# Patient Record
Sex: FEMALE | Race: WHITE | NOT HISPANIC OR LATINO | Employment: OTHER | URBAN - NONMETROPOLITAN AREA
[De-identification: names, ages, dates, MRNs, and addresses within clinical notes are randomized per-mention and may not be internally consistent; named-entity substitution may affect disease eponyms.]

---

## 2017-01-01 ENCOUNTER — TRANSCRIBE ORDERS (OUTPATIENT)
Dept: LAB | Facility: HOSPITAL | Age: 62
End: 2017-01-01

## 2017-01-01 ENCOUNTER — OFFICE VISIT (OUTPATIENT)
Dept: FAMILY MEDICINE CLINIC | Facility: CLINIC | Age: 62
End: 2017-01-01

## 2017-01-01 ENCOUNTER — APPOINTMENT (OUTPATIENT)
Dept: LAB | Facility: HOSPITAL | Age: 62
End: 2017-01-01

## 2017-01-01 ENCOUNTER — TELEPHONE (OUTPATIENT)
Dept: FAMILY MEDICINE CLINIC | Facility: CLINIC | Age: 62
End: 2017-01-01

## 2017-01-01 VITALS
WEIGHT: 154 LBS | SYSTOLIC BLOOD PRESSURE: 154 MMHG | TEMPERATURE: 98.8 F | HEIGHT: 62 IN | HEART RATE: 114 BPM | DIASTOLIC BLOOD PRESSURE: 98 MMHG | OXYGEN SATURATION: 90 % | BODY MASS INDEX: 28.34 KG/M2

## 2017-01-01 VITALS
SYSTOLIC BLOOD PRESSURE: 158 MMHG | BODY MASS INDEX: 28.34 KG/M2 | OXYGEN SATURATION: 85 % | TEMPERATURE: 98.1 F | HEIGHT: 62 IN | HEART RATE: 103 BPM | DIASTOLIC BLOOD PRESSURE: 94 MMHG | WEIGHT: 154 LBS

## 2017-01-01 VITALS
SYSTOLIC BLOOD PRESSURE: 154 MMHG | OXYGEN SATURATION: 90 % | DIASTOLIC BLOOD PRESSURE: 82 MMHG | HEART RATE: 93 BPM | TEMPERATURE: 99.3 F | HEIGHT: 62 IN

## 2017-01-01 DIAGNOSIS — G60.9 IDIOPATHIC PERIPHERAL NEUROPATHY: ICD-10-CM

## 2017-01-01 DIAGNOSIS — Z51.81 ENCOUNTER FOR THERAPEUTIC DRUG MONITORING: ICD-10-CM

## 2017-01-01 DIAGNOSIS — R60.9 PERIPHERAL EDEMA: ICD-10-CM

## 2017-01-01 DIAGNOSIS — I50.9 CHRONIC CONGESTIVE HEART FAILURE, UNSPECIFIED CONGESTIVE HEART FAILURE TYPE: Primary | ICD-10-CM

## 2017-01-01 DIAGNOSIS — R09.02 COPD WITH HYPOXIA (HCC): ICD-10-CM

## 2017-01-01 DIAGNOSIS — I49.9 IRREGULAR HEART RHYTHM: ICD-10-CM

## 2017-01-01 DIAGNOSIS — I50.9 CHRONIC CONGESTIVE HEART FAILURE, UNSPECIFIED CONGESTIVE HEART FAILURE TYPE: ICD-10-CM

## 2017-01-01 DIAGNOSIS — F41.9 ANXIETY: ICD-10-CM

## 2017-01-01 DIAGNOSIS — I10 ESSENTIAL HYPERTENSION: ICD-10-CM

## 2017-01-01 DIAGNOSIS — J43.9 PULMONARY EMPHYSEMA, UNSPECIFIED EMPHYSEMA TYPE (HCC): ICD-10-CM

## 2017-01-01 DIAGNOSIS — R56.9 UNSPECIFIED CONVULSIONS (HCC): Primary | ICD-10-CM

## 2017-01-01 DIAGNOSIS — R09.02 COPD WITH HYPOXIA (HCC): Primary | ICD-10-CM

## 2017-01-01 DIAGNOSIS — I10 ESSENTIAL HYPERTENSION: Primary | ICD-10-CM

## 2017-01-01 DIAGNOSIS — J44.9 COPD WITH HYPOXIA (HCC): Primary | ICD-10-CM

## 2017-01-01 DIAGNOSIS — J44.9 COPD WITH HYPOXIA (HCC): ICD-10-CM

## 2017-01-01 DIAGNOSIS — N18.30 CKD (CHRONIC KIDNEY DISEASE) STAGE 3, GFR 30-59 ML/MIN (HCC): ICD-10-CM

## 2017-01-01 DIAGNOSIS — F32.9 SINGLE CURRENT EPISODE OF MAJOR DEPRESSIVE DISORDER, UNSPECIFIED DEPRESSION EPISODE SEVERITY: ICD-10-CM

## 2017-01-01 LAB
ANION GAP SERPL CALCULATED.3IONS-SCNC: 13 MMOL/L (ref 5–15)
BUN BLD-MCNC: 17 MG/DL (ref 7–21)
BUN/CREAT SERPL: 11.6 (ref 7–25)
CALCIUM SPEC-SCNC: 8.8 MG/DL (ref 8.4–10.2)
CHLORIDE SERPL-SCNC: 97 MMOL/L (ref 95–110)
CO2 SERPL-SCNC: 26 MMOL/L (ref 22–31)
CREAT BLD-MCNC: 1.47 MG/DL (ref 0.5–1)
DEPRECATED RDW RBC AUTO: 40.8 FL (ref 36.4–46.3)
EOSINOPHIL # BLD MANUAL: 0.33 10*3/MM3 (ref 0–0.7)
EOSINOPHIL NFR BLD MANUAL: 9 % (ref 0–7)
ERYTHROCYTE [DISTWIDTH] IN BLOOD BY AUTOMATED COUNT: 13.5 % (ref 11.5–14.5)
GFR SERPL CREATININE-BSD FRML MDRD: 36 ML/MIN/1.73 (ref 60–104)
GLUCOSE BLD-MCNC: 91 MG/DL (ref 60–100)
HCT VFR BLD AUTO: 34.9 % (ref 35–45)
HGB BLD-MCNC: 11.9 G/DL (ref 12–15.5)
LYMPHOCYTES # BLD MANUAL: 1.5 10*3/MM3 (ref 0.6–4.2)
LYMPHOCYTES NFR BLD MANUAL: 12 % (ref 0–12)
LYMPHOCYTES NFR BLD MANUAL: 41 % (ref 10–50)
MCH RBC QN AUTO: 28.2 PG (ref 26.5–34)
MCHC RBC AUTO-ENTMCNC: 34.1 G/DL (ref 31.4–36)
MCV RBC AUTO: 82.7 FL (ref 80–98)
MONOCYTES # BLD AUTO: 0.44 10*3/MM3 (ref 0–0.9)
NEUTROPHILS # BLD AUTO: 1.39 10*3/MM3 (ref 2–8.6)
NEUTROPHILS NFR BLD MANUAL: 38 % (ref 37–80)
OXCARBAZEPINE: NORMAL UG/ML (ref 10–35)
PLAT MORPH BLD: NORMAL
PLATELET # BLD AUTO: 206 10*3/MM3 (ref 150–450)
PMV BLD AUTO: 9.3 FL (ref 8–12)
POTASSIUM BLD-SCNC: 4.8 MMOL/L (ref 3.5–5.1)
RBC # BLD AUTO: 4.22 10*6/MM3 (ref 3.77–5.16)
RBC MORPH BLD: NORMAL
SODIUM BLD-SCNC: 136 MMOL/L (ref 137–145)
WBC MORPH BLD: NORMAL
WBC NRBC COR # BLD: 3.66 10*3/MM3 (ref 3.2–9.8)

## 2017-01-01 PROCEDURE — 99214 OFFICE O/P EST MOD 30 MIN: CPT | Performed by: NURSE PRACTITIONER

## 2017-01-01 PROCEDURE — 80183 DRUG SCRN QUANT OXCARBAZEPIN: CPT | Performed by: PSYCHIATRY & NEUROLOGY

## 2017-01-01 PROCEDURE — 99203 OFFICE O/P NEW LOW 30 MIN: CPT | Performed by: NURSE PRACTITIONER

## 2017-01-01 PROCEDURE — 36415 COLL VENOUS BLD VENIPUNCTURE: CPT | Performed by: PSYCHIATRY & NEUROLOGY

## 2017-01-01 PROCEDURE — 93000 ELECTROCARDIOGRAM COMPLETE: CPT | Performed by: NURSE PRACTITIONER

## 2017-01-01 PROCEDURE — 85027 COMPLETE CBC AUTOMATED: CPT | Performed by: PSYCHIATRY & NEUROLOGY

## 2017-01-01 PROCEDURE — 85007 BL SMEAR W/DIFF WBC COUNT: CPT | Performed by: PSYCHIATRY & NEUROLOGY

## 2017-01-01 PROCEDURE — 80048 BASIC METABOLIC PNL TOTAL CA: CPT | Performed by: PSYCHIATRY & NEUROLOGY

## 2017-01-01 RX ORDER — GABAPENTIN 300 MG/1
3 CAPSULE ORAL DAILY
Refills: 3 | COMMUNITY
Start: 2017-01-01 | End: 2018-01-01 | Stop reason: SDUPTHER

## 2017-01-01 RX ORDER — HYDROXYZINE HYDROCHLORIDE 10 MG/1
3 TABLET, FILM COATED ORAL DAILY
Refills: 4 | COMMUNITY
Start: 2017-01-01

## 2017-01-01 RX ORDER — SPIRONOLACTONE AND HYDROCHLOROTHIAZIDE 25; 25 MG/1; MG/1
1 TABLET ORAL DAILY
Refills: 12 | COMMUNITY
Start: 2017-01-01

## 2017-01-01 RX ORDER — ALBUTEROL SULFATE 90 UG/1
1 AEROSOL, METERED RESPIRATORY (INHALATION) AS NEEDED
Refills: 12 | COMMUNITY
Start: 2017-01-01

## 2017-01-01 RX ORDER — POTASSIUM CHLORIDE 750 MG/1
1 TABLET, EXTENDED RELEASE ORAL DAILY
COMMUNITY
Start: 2017-01-01

## 2017-01-01 RX ORDER — RANITIDINE 150 MG/1
1 TABLET ORAL DAILY
COMMUNITY
Start: 2017-01-01 | End: 2018-01-01

## 2017-01-01 RX ORDER — FEXOFENADINE HCL 180 MG/1
1 TABLET, FILM COATED ORAL DAILY
Refills: 12 | COMMUNITY
Start: 2017-01-01

## 2017-01-01 RX ORDER — CARVEDILOL 12.5 MG/1
12.5 TABLET ORAL 2 TIMES DAILY
Qty: 60 TABLET | Refills: 5 | Status: SHIPPED | OUTPATIENT
Start: 2017-01-01

## 2017-01-01 RX ORDER — CETIRIZINE HYDROCHLORIDE 10 MG/1
1 TABLET, FILM COATED ORAL DAILY
Refills: 12 | COMMUNITY
Start: 2017-01-01

## 2017-01-01 RX ORDER — NITROGLYCERIN 120 MG/1
1 PATCH TRANSDERMAL AS NEEDED
COMMUNITY
Start: 2017-01-01

## 2017-01-01 RX ORDER — CARVEDILOL 3.12 MG/1
2 TABLET ORAL DAILY
Refills: 6 | COMMUNITY
Start: 2017-01-01 | End: 2017-01-01 | Stop reason: SDUPTHER

## 2017-01-01 RX ORDER — DIGOXIN 125 MCG
1 TABLET ORAL DAILY
Refills: 6 | COMMUNITY
Start: 2017-01-01

## 2017-01-01 RX ORDER — PROMETHAZINE HYDROCHLORIDE 25 MG/1
2 TABLET ORAL DAILY
COMMUNITY
Start: 2017-01-01 | End: 2018-01-01 | Stop reason: SDUPTHER

## 2017-01-01 RX ORDER — FUROSEMIDE 40 MG/1
1 TABLET ORAL DAILY
Refills: 5 | COMMUNITY
Start: 2017-01-01

## 2017-01-01 RX ORDER — FEXOFENADINE HYDROCHLORIDE AND PSEUDOEPHEDRINE HYDROCHLORIDE 60; 120 MG/1; MG/1
2 TABLET, FILM COATED, EXTENDED RELEASE ORAL DAILY
COMMUNITY
Start: 2017-01-01

## 2017-11-27 PROBLEM — I50.9 CHRONIC CONGESTIVE HEART FAILURE (HCC): Status: ACTIVE | Noted: 2017-01-01

## 2017-11-27 PROBLEM — R60.9 PERIPHERAL EDEMA: Status: ACTIVE | Noted: 2017-01-01

## 2017-11-27 PROBLEM — I10 ESSENTIAL HYPERTENSION: Status: ACTIVE | Noted: 2017-01-01

## 2017-11-27 PROBLEM — F41.9 ANXIETY: Status: ACTIVE | Noted: 2017-01-01

## 2017-11-27 PROBLEM — J43.9 PULMONARY EMPHYSEMA (HCC): Status: ACTIVE | Noted: 2017-01-01

## 2017-11-27 PROBLEM — G60.9 IDIOPATHIC PERIPHERAL NEUROPATHY: Status: ACTIVE | Noted: 2017-01-01

## 2017-11-27 NOTE — PROGRESS NOTES
"Chief Complaint   Patient presents with   • Foot Swelling     ongoing history    • Shortness of Breath     at night and when walking        Subjective   HPI   Mariela Torres is a 62 y.o. female presents to the office to establish care.   Prior PCP Dr. Christopher    PMH:   HTN- managed with carvedilol    CHF- managed with lasix, aldactone    COPD- managed with albuterol, incruse    Peripheral neuropathy- managed with gabapentin    Hypokalemia- managed with Kclor    GERD- daily zantac    HPI  Patient presents to establish care. Acute on chronic complaints include difficulty breathing and BLE pain and edema. She states she is establishing care only because her children fear her current PCP is \"going to die and I wont have anyone to take over my care\".     She is unhappy about being here as she feels forced to be here by her children. She states \"all I want to do is be left alone\". She states several times that her son is \"driving me crazy\".     She states she is happy with her current PCP and does not care to change at this time.     Medical history is reviewed with patient and is complex.   When asked what she would like to complete today, she replies, \"I would like to breath better and for my legs to not hurt as much. I think I would go out more, maybe go dancing if my legs felt better. But I cant hardly get too far without having trouble breathing\".     She denies suicidal thoughts and ideation, but does admit if she \"were to die, it wouldn't be a bad thing. May be a blessing in disguise\".     Medical records requested from Dr. Christopher     Family History   Problem Relation Age of Onset   • Diabetes Mother      Social History     Social History   • Marital status:      Spouse name: N/A   • Number of children: N/A   • Years of education: N/A     Occupational History   • Not on file.     Social History Main Topics   • Smoking status: Current Every Day Smoker     Packs/day: 0.50     Years: 45.00     Types: " Cigarettes   • Smokeless tobacco: Current User     Types: Snuff   • Alcohol use No   • Drug use: No   • Sexual activity: No     Other Topics Concern   • Not on file     Social History Narrative   • No narrative on file       Review of Systems   Constitutional: Positive for activity change and appetite change. Negative for chills, fatigue, fever and unexpected weight change.   HENT: Negative.  Negative for congestion, ear pain, postnasal drip, rhinorrhea, sinus pressure and sore throat.    Eyes: Negative.  Negative for pain and redness.   Respiratory: Positive for shortness of breath and wheezing. Negative for cough, choking and chest tightness.    Cardiovascular: Negative.  Negative for chest pain, palpitations and leg swelling.   Gastrointestinal: Negative.  Negative for abdominal distention, abdominal pain, constipation, diarrhea, nausea and rectal pain.   Endocrine: Negative.    Genitourinary: Negative.  Negative for decreased urine volume, difficulty urinating, dysuria, flank pain, hematuria and urgency.   Musculoskeletal: Positive for arthralgias, back pain and myalgias. Negative for gait problem and neck pain.   Skin: Negative.  Negative for rash and wound.   Allergic/Immunologic: Negative.    Neurological: Positive for weakness and numbness. Negative for dizziness, syncope, light-headedness and headaches.   Hematological: Negative.    Psychiatric/Behavioral: Positive for dysphoric mood. Negative for agitation, behavioral problems, confusion, self-injury, sleep disturbance and suicidal ideas. The patient is not nervous/anxious.      14 Point ROS completed with pertinent positives discussed. All other systems reviewed and are negative.     The following portions of the patient's history were reviewed and updated as appropriate: allergies, current medications, past family history, past medical history, past social history, past surgical history and problem list.    Encounter Vitals  Vitals:    11/27/17 0838  "  BP: 158/94   Pulse: 103   Temp: 98.1 °F (36.7 °C)   TempSrc: Tympanic   SpO2: (!) 85%   Weight: 154 lb (69.9 kg)   Height: 62\" (157.5 cm)   PainSc: 0-No pain       Objective:  Physical Exam   Constitutional: She is oriented to person, place, and time. She appears well-developed and well-nourished.  Non-toxic appearance.   HENT:   Head: Normocephalic and atraumatic.   Right Ear: Tympanic membrane, external ear and ear canal normal.   Left Ear: Tympanic membrane, external ear and ear canal normal.   Nose: Nose normal.   Mouth/Throat: Uvula is midline, oropharynx is clear and moist and mucous membranes are normal. No posterior oropharyngeal edema or posterior oropharyngeal erythema.   Eyes: Lids are normal. Pupils are equal, round, and reactive to light.   Neck: Trachea normal and normal range of motion. Neck supple. No thyroid mass present.   Cardiovascular: Normal rate, S1 normal, S2 normal, normal heart sounds and intact distal pulses.  An irregularly irregular rhythm present. Frequent extrasystoles are present. Exam reveals no gallop and no friction rub.    No murmur heard.  HTN  Heart rhythm sounds and feels like A-fib  EKG completed   Pulmonary/Chest: Effort normal. No respiratory distress. She has wheezes in the right upper field, the right lower field, the left upper field and the left lower field. She has rhonchi in the right lower field and the left lower field. She has no rales.   Abdominal: Soft. Normal appearance and bowel sounds are normal. She exhibits no mass. There is no rebound and no guarding.   Musculoskeletal: Normal range of motion.   Lymphadenopathy:     She has no cervical adenopathy.   Neurological: She is alert and oriented to person, place, and time. She has normal strength. No cranial nerve deficit or sensory deficit. Gait normal.   Skin: Skin is warm and dry. No rash noted.   Psychiatric: She has a normal mood and affect. Her speech is normal and behavior is normal. Judgment and thought " content normal. Cognition and memory are normal.   Nursing note and vitals reviewed.      Pertinent Labs  No visits with results within 3 Month(s) from this visit.  Latest known visit with results is:    Transcribe Orders on 03/30/2017   Component Date Value Ref Range Status   • Glucose 03/30/2017 91  60 - 100 mg/dL Final   • BUN 03/30/2017 17  7 - 21 mg/dL Final   • Creatinine 03/30/2017 1.47* 0.50 - 1.00 mg/dL Final   • Sodium 03/30/2017 136* 137 - 145 mmol/L Final   • Potassium 03/30/2017 4.8  3.5 - 5.1 mmol/L Final   • Chloride 03/30/2017 97  95 - 110 mmol/L Final   • CO2 03/30/2017 26.0  22.0 - 31.0 mmol/L Final   • Calcium 03/30/2017 8.8  8.4 - 10.2 mg/dL Final   • eGFR Non African Amer 03/30/2017 36* >60 mL/min/1.73 Final   • BUN/Creatinine Ratio 03/30/2017 11.6  7.0 - 25.0 Final   • Anion Gap 03/30/2017 13.0  5.0 - 15.0 mmol/L Final   • Oxcarbazepine 03/30/2017 None Detected  10 - 35 ug/mL Final                                    Detection Limit = 1   • WBC 03/30/2017 3.66  3.20 - 9.80 10*3/mm3 Final   • RBC 03/30/2017 4.22  3.77 - 5.16 10*6/mm3 Final   • Hemoglobin 03/30/2017 11.9* 12.0 - 15.5 g/dL Final   • Hematocrit 03/30/2017 34.9* 35.0 - 45.0 % Final   • MCV 03/30/2017 82.7  80.0 - 98.0 fL Final   • MCH 03/30/2017 28.2  26.5 - 34.0 pg Final   • MCHC 03/30/2017 34.1  31.4 - 36.0 g/dL Final   • RDW 03/30/2017 13.5  11.5 - 14.5 % Final   • RDW-SD 03/30/2017 40.8  36.4 - 46.3 fl Final   • MPV 03/30/2017 9.3  8.0 - 12.0 fL Final   • Platelets 03/30/2017 206  150 - 450 10*3/mm3 Final   • Neutrophil % 03/30/2017 38.0  37.0 - 80.0 % Final   • Lymphocyte % 03/30/2017 41.0  10.0 - 50.0 % Final   • Monocyte % 03/30/2017 12.0  0.0 - 12.0 % Final   • Eosinophil % 03/30/2017 9.0* 0.0 - 7.0 % Final   • Neutrophils Absolute 03/30/2017 1.39* 2.00 - 8.60 10*3/mm3 Final   • Lymphocytes Absolute 03/30/2017 1.50  0.60 - 4.20 10*3/mm3 Final   • Monocytes Absolute 03/30/2017 0.44  0.00 - 0.90 10*3/mm3 Final   • Eosinophils  "Absolute 03/30/2017 0.33  0.00 - 0.70 10*3/mm3 Final   • RBC Morphology 03/30/2017 Normal  Normal Final   • WBC Morphology 03/30/2017 Normal  Normal Final   • Platelet Morphology 03/30/2017 Normal  Normal Final     Labs have been independently reviewed    Key Imaging/Tracings/POC Testing  EKG:  Indication- abnormal rhythm palpated and auscultated  No comparison on file    Vent rate: 104 bpm  Mirta: 140 ms  QRS: 136 ms  QT/QTc: 386/500 ms    Sinus tach with PAC versus A-fib    Assessment and Medications  Mariela was seen today for foot swelling and shortness of breath.    Diagnoses and all orders for this visit:    Essential hypertension    Irregular heart rhythm  -     ECG 12 Lead    Peripheral edema    Chronic congestive heart failure, unspecified congestive heart failure type    COPD with hypoxia    Single current episode of major depressive disorder, unspecified depression episode severity           Plan/Additional Notes/Instructions  Plan   1. Discussed in length the complex nature of her cardiovascular and respiratory disorders. Discussed concern for possible A-fib. Discussed concern for worsening CHF and COPD- with possibility of nearing end of life disease progression without medical intervention.   2. Strongly recommended patient to be transported to hospital of choice given her complex medical problems: irregular heart rhythm, worsening COPD, CHF, and BLE edema and pain  3. Offered to refer patient to pulmonary and cardiology if she would not consider inpatient care  4. Patient refuses all treatment and referrals today  5. Patient reiterates she is happy with her current PCP and will continue to see him until he no longer practices.   6. She states, \"my check is coming Friday; I dont want my son to get it. If Im still here next week, Ill think about coming back to see you and going in the hospital for a few days\".   7. Will still review medical record independently once obtained  8. Patient is alert, oriented, " and capable of making her own medical decisions. No confusion or disorientation.  9. Patient denies thoughts and/or intentions on harming herself. She denies suicidal thoughts and ideation. She verbalizes she understands her complex medical conditions and also states she knows her health is deteriorating rapidly. She again states she does not wish to medically intervene at this time.     10. Patient has chosen to leave AMA with full understanding of possible progression of each of her complex medical problems, also to include death.  11. I have reviewed the above notes with patient, and she has been made aware the above notes would be a part of her medical record.     Follow-Up  Return if symptoms worsen or fail to improve.    Patient/caregiver verbalizes understanding of all orders and instructions in this plan of care.       PHQ-2/PHQ-9 Depression Screening 11/27/2017   Little interest or pleasure in doing things 2   Feeling down, depressed, or hopeless 2   Trouble falling or staying asleep, or sleeping too much 3   Feeling tired or having little energy 3   Poor appetite or overeating 3   Feeling bad about yourself - or that you are a failure or have let yourself or your family down 3   Trouble concentrating on things, such as reading the newspaper or watching television 0   Moving or speaking so slowly that other people could have noticed. Or the opposite - being so fidgety or restless that you have been moving around a lot more than usual 3   Thoughts that you would be better off dead, or of hurting yourself in some way 2   Total Score 21   If you checked off any problems, how difficult have these problems made it for you to do your work, take care of things at home, or get along with other people? Somewhat difficult     Total Score: 21           This document has been electronically signed by DANIELE Carpenter on November 29, 2017 9:53 AM

## 2017-11-28 PROBLEM — I49.9 IRREGULAR HEART RHYTHM: Status: ACTIVE | Noted: 2017-01-01

## 2017-11-28 PROBLEM — R09.02 COPD WITH HYPOXIA (HCC): Status: ACTIVE | Noted: 2017-01-01

## 2017-11-28 PROBLEM — J44.9 COPD WITH HYPOXIA (HCC): Status: ACTIVE | Noted: 2017-01-01

## 2017-11-28 NOTE — PROGRESS NOTES
If patient returns this week, I would like to see her admitted for inpatient evaluation.   If she is not agreeable to inpatient rehab- will likely order:    Orders:  full labs to include BNP.     CXR, PFT, echo.    Meds:   Will start ACE-I and possibly increase lasix    Referrals:  pulm  cardiology

## 2017-12-05 NOTE — PROGRESS NOTES
"Chief Complaint   Patient presents with   • Hypertension   • Foot Swelling     fluid on feet and legs    • Breathing Problem   • Fall     fainting and falling - 5 times since 11/27/17   • Skin Lesion     on back - itch   • Memory Loss       Subjective     Mariela Torres is a 62 y.o. female presents to the office requesting to proceed with medical work up that was suggested at her last office visit.  Patient is accompanied by her sister.     Prior PCP Dr. Christopher    [facility]:   HTN- managed with carvedilol- poorly controlled    CHF- managed with lasix, aldactone- poorly controlled    COPD- managed with albuterol, incruse-poorly controlled    Peripheral neuropathy- managed with gabapentin    Hypokalemia- managed with Kclor    GERD- daily zantac    HPI   Patient presents with c/o difficulty breathing, ble edema, and falling 5 times since our last office visit on 11/27/17. She states she is \"ready to be taken to the hospital to have a work up to see what all is going on\". She expresses interest in seeing someone about her \"breathing\" and about \"my heart\".   Breathing is better today than at our last appointment. She continue to c/o  Of KOLB, orthopnea and PND, but isn't as winded with activity today.     BLE edema is the same- no change. Associated symptoms include numbness to bilateral feet.     She denies fever, chest pain, diaphoresis, pink frothy sputum, and feelings of impending doom.     She continues to voice depression, but does admit she feels a little better today.       Hypertension   This is a chronic problem. The current episode started more than 1 year ago. The problem is unchanged. The problem is uncontrolled. Associated symptoms include orthopnea, palpitations, peripheral edema, PND and shortness of breath. There are no associated agents to hypertension. Risk factors for coronary artery disease include family history, dyslipidemia, sedentary lifestyle and smoking/tobacco exposure. Past treatments include " beta blockers. The current treatment provides mild improvement. Compliance problems include diet and exercise.  Hypertensive end-organ damage includes heart failure.   Breathing Problem   She complains of difficulty breathing, shortness of breath and wheezing. This is a chronic problem. The problem occurs daily. The cough is productive of sputum. Associated symptoms include appetite change, orthopnea and PND. Pertinent negatives include no ear pain, postnasal drip or rhinorrhea. Her symptoms are aggravated by lying down, strenuous activity and URI. Her symptoms are alleviated by change in position. She reports no improvement on treatment.   Fall   The accident occurred 5 to 7 days ago. The fall occurred while standing and while walking. Distance fallen: from standing. She landed on hard floor. The volume of blood lost was minimal. The point of impact was the head (BUE). Pertinent negatives include no hematuria.     Medical records requested from Dr. Christopher     Family History   Problem Relation Age of Onset   • Diabetes Mother      Social History     Social History   • Marital status:      Spouse name: N/A   • Number of children: N/A   • Years of education: N/A     Occupational History   • Not on file.     Social History Main Topics   • Smoking status: Current Every Day Smoker     Packs/day: 0.50     Years: 45.00     Types: Cigarettes   • Smokeless tobacco: Current User     Types: Snuff   • Alcohol use No   • Drug use: No   • Sexual activity: No     Other Topics Concern   • Not on file     Social History Narrative       Review of Systems   Constitutional: Positive for activity change and appetite change. Negative for unexpected weight change.   HENT: Negative.  Negative for ear pain, postnasal drip, rhinorrhea and sinus pressure.    Eyes: Negative.  Negative for pain and redness.   Respiratory: Positive for shortness of breath and wheezing. Negative for choking and chest tightness.    Cardiovascular:  "Positive for palpitations, orthopnea, leg swelling and PND.   Gastrointestinal: Negative.  Negative for abdominal distention, constipation, diarrhea and rectal pain.   Endocrine: Negative.    Genitourinary: Negative.  Negative for decreased urine volume, difficulty urinating, dysuria, flank pain, hematuria and urgency.   Musculoskeletal: Positive for back pain. Negative for gait problem.   Skin: Negative.  Negative for wound.   Allergic/Immunologic: Negative.    Neurological: Positive for syncope. Negative for dizziness and light-headedness.   Hematological: Negative.    Psychiatric/Behavioral: Positive for dysphoric mood. Negative for agitation, behavioral problems, confusion, self-injury, sleep disturbance and suicidal ideas. The patient is not nervous/anxious.      14 Point ROS completed with pertinent positives discussed. All other systems reviewed and are negative.     The following portions of the patient's history were reviewed and updated as appropriate: allergies, current medications, past family history, past medical history, past social history, past surgical history and problem list.    Encounter Vitals  Vitals:    12/04/17 0923   BP: 154/92   Pulse: 114   Temp: 98.8 °F (37.1 °C)   TempSrc: Tympanic   SpO2: 90%   Weight: 69.9 kg (154 lb)   Height: 157.5 cm (62\")   PainSc: 0-No pain     Vitals:    12/04/17 0923 12/04/17 0945 12/04/17 0946 12/04/17 0947   BP: 154/92 (!) 168/110  Comment: lying 150/98  Comment: sitting 154/98  Comment: standing   Pulse: 114      Temp: 98.8 °F (37.1 °C)      TempSrc: Tympanic      SpO2: 90%      Weight: 69.9 kg (154 lb)      Height: 157.5 cm (62\")      PainSc: 0-No pain Comment: Lying Comment: Sitting Comment: Standing     Objective:  Physical Exam   Constitutional: She is oriented to person, place, and time. She appears well-developed and well-nourished.  Non-toxic appearance.   abd vitals   HENT:   Head: Normocephalic and atraumatic.   Right Ear: Tympanic membrane, " external ear and ear canal normal.   Left Ear: Tympanic membrane, external ear and ear canal normal.   Nose: Nose normal.   Mouth/Throat: Uvula is midline, oropharynx is clear and moist and mucous membranes are normal. Abnormal dentition. No posterior oropharyngeal edema or posterior oropharyngeal erythema.   Eyes: Lids are normal. Pupils are equal, round, and reactive to light.   Neck: Trachea normal and normal range of motion. Neck supple. No thyroid mass present.   Cardiovascular: S1 normal, S2 normal, normal heart sounds and intact distal pulses.  An irregularly irregular rhythm present. Frequent extrasystoles are present. Tachycardia present.  Exam reveals no gallop and no friction rub.    No murmur heard.  HTN  Heart rhythm sounds and feels like A-fib  BLE edema, 4+, non-pitting   Pulmonary/Chest: Effort normal. No respiratory distress. She has wheezes in the right upper field, the right lower field, the left upper field and the left lower field. She has rhonchi in the right lower field and the left lower field. She has no rales.   Abdominal: Soft. Normal appearance and bowel sounds are normal. She exhibits no mass. There is no rebound and no guarding.   Musculoskeletal: Normal range of motion.   Lymphadenopathy:     She has no cervical adenopathy.   Neurological: She is alert and oriented to person, place, and time. She has normal strength. No cranial nerve deficit or sensory deficit. Gait normal.   Skin: Skin is warm and dry. No rash noted.   Psychiatric: She has a normal mood and affect. Her speech is normal and behavior is normal. Judgment and thought content normal. Cognition and memory are normal.   Nursing note and vitals reviewed.      Pertinent Labs  No visits with results within 3 Month(s) from this visit.  Latest known visit with results is:    Transcribe Orders on 03/30/2017   Component Date Value Ref Range Status   • Glucose 03/30/2017 91  60 - 100 mg/dL Final   • BUN 03/30/2017 17  7 - 21 mg/dL  Final   • Creatinine 03/30/2017 1.47* 0.50 - 1.00 mg/dL Final   • Sodium 03/30/2017 136* 137 - 145 mmol/L Final   • Potassium 03/30/2017 4.8  3.5 - 5.1 mmol/L Final   • Chloride 03/30/2017 97  95 - 110 mmol/L Final   • CO2 03/30/2017 26.0  22.0 - 31.0 mmol/L Final   • Calcium 03/30/2017 8.8  8.4 - 10.2 mg/dL Final   • eGFR Non African Amer 03/30/2017 36* >60 mL/min/1.73 Final   • BUN/Creatinine Ratio 03/30/2017 11.6  7.0 - 25.0 Final   • Anion Gap 03/30/2017 13.0  5.0 - 15.0 mmol/L Final   • Oxcarbazepine 03/30/2017 None Detected  10 - 35 ug/mL Final                                    Detection Limit = 1   • WBC 03/30/2017 3.66  3.20 - 9.80 10*3/mm3 Final   • RBC 03/30/2017 4.22  3.77 - 5.16 10*6/mm3 Final   • Hemoglobin 03/30/2017 11.9* 12.0 - 15.5 g/dL Final   • Hematocrit 03/30/2017 34.9* 35.0 - 45.0 % Final   • MCV 03/30/2017 82.7  80.0 - 98.0 fL Final   • MCH 03/30/2017 28.2  26.5 - 34.0 pg Final   • MCHC 03/30/2017 34.1  31.4 - 36.0 g/dL Final   • RDW 03/30/2017 13.5  11.5 - 14.5 % Final   • RDW-SD 03/30/2017 40.8  36.4 - 46.3 fl Final   • MPV 03/30/2017 9.3  8.0 - 12.0 fL Final   • Platelets 03/30/2017 206  150 - 450 10*3/mm3 Final   • Neutrophil % 03/30/2017 38.0  37.0 - 80.0 % Final   • Lymphocyte % 03/30/2017 41.0  10.0 - 50.0 % Final   • Monocyte % 03/30/2017 12.0  0.0 - 12.0 % Final   • Eosinophil % 03/30/2017 9.0* 0.0 - 7.0 % Final   • Neutrophils Absolute 03/30/2017 1.39* 2.00 - 8.60 10*3/mm3 Final   • Lymphocytes Absolute 03/30/2017 1.50  0.60 - 4.20 10*3/mm3 Final   • Monocytes Absolute 03/30/2017 0.44  0.00 - 0.90 10*3/mm3 Final   • Eosinophils Absolute 03/30/2017 0.33  0.00 - 0.70 10*3/mm3 Final   • RBC Morphology 03/30/2017 Normal  Normal Final   • WBC Morphology 03/30/2017 Normal  Normal Final   • Platelet Morphology 03/30/2017 Normal  Normal Final     Labs have been independently reviewed    Key Imaging/Tracings/POC Testing    Assessment and Medications  Mariela was seen today for hypertension,  foot swelling, breathing problem, fall, skin lesion and memory loss.    Diagnoses and all orders for this visit:    COPD with hypoxia    Irregular heart rhythm    Chronic congestive heart failure, unspecified congestive heart failure type    Essential hypertension    Peripheral edema    Idiopathic peripheral neuropathy           Plan/Additional Notes/Instructions  Plan   1. Per patient request, will send patient to [facility] ER for further work-up  2. resp status is stable at this time. I feel comfortable with allowing patients sister to transport the patient to  The ER via private car   3. Report called to Dr. Vuong at [facility]  4. Patient to f/u after [facility] d/c    Follow-Up  Return if symptoms worsen or fail to improve.    Patient/caregiver verbalizes understanding of all orders and instructions in this plan of care.       PHQ-2/PHQ-9 Depression Screening 11/27/2017   Little interest or pleasure in doing things 2   Feeling down, depressed, or hopeless 2   Trouble falling or staying asleep, or sleeping too much 3   Feeling tired or having little energy 3   Poor appetite or overeating 3   Feeling bad about yourself - or that you are a failure or have let yourself or your family down 3   Trouble concentrating on things, such as reading the newspaper or watching television 0   Moving or speaking so slowly that other people could have noticed. Or the opposite - being so fidgety or restless that you have been moving around a lot more than usual 3   Thoughts that you would be better off dead, or of hurting yourself in some way 2   Total Score 21   If you checked off any problems, how difficult have these problems made it for you to do your work, take care of things at home, or get along with other people? Somewhat difficult                 This document has been electronically signed by DANIELE Carpenter on December 5, 2017 2:20 PM

## 2017-12-13 PROBLEM — N18.30 CKD (CHRONIC KIDNEY DISEASE) STAGE 3, GFR 30-59 ML/MIN (HCC): Status: ACTIVE | Noted: 2017-01-01

## 2017-12-13 NOTE — PROGRESS NOTES
"Chief Complaint   Patient presents with   • COPD     hospital fu        Ayla Torres is a 62 y.o. female presents to the office for evaluation of COPD, Afib, CHF, peripheral edema, and hospital f/u. She is accompanied by her son who provides additional history.     PMH  HTN- managed with carvedilol- poorly controlled- med increased to 12.5 BID at [facility]    CHF- managed with lasix, aldactone- poorly controlled    COPD- managed with albuterol, incruse-poorly controlled    Peripheral neuropathy- managed with gabapentin    Hypokalemia- managed with Kclor    GERD- daily zantac    HPI   Patient presents with son for [facility] f/u. She denies acute complaints today.   She states she is doing \"much better\".  BLE edema has resolved. She states she can breath better. She is trying to quit smoking. She denies remembering being here for last two office visits. She would like to continue treatment with me and be seen on a regular basis per patient statement.     Her son has FMLA paperwork is requesting to have completed.     Patient was sent home with hospice. She is agreeable to speak with hospice to have clear DNR/DNI orders placed.     [facility] records reviewed    HPI  Family History   Problem Relation Age of Onset   • Diabetes Mother      Social History     Social History   • Marital status:      Spouse name: N/A   • Number of children: N/A   • Years of education: N/A     Occupational History   • Not on file.     Social History Main Topics   • Smoking status: Current Every Day Smoker     Packs/day: 0.50     Years: 45.00     Types: Cigarettes   • Smokeless tobacco: Current User     Types: Snuff   • Alcohol use No   • Drug use: No   • Sexual activity: No     Other Topics Concern   • Not on file     Social History Narrative       The following portions of the patient's history were reviewed and updated as appropriate: allergies, current medications, past family history, past medical history, past social history, past " "surgical history and problem list.    Review of Systems   Constitutional: Positive for appetite change. Negative for activity change and unexpected weight change.   HENT: Negative.  Negative for ear pain, postnasal drip, rhinorrhea and sinus pressure.    Eyes: Negative.  Negative for pain and redness.   Respiratory: Negative for choking, chest tightness, shortness of breath and wheezing.    Cardiovascular: Negative for palpitations and leg swelling.   Gastrointestinal: Negative.  Negative for abdominal distention, constipation, diarrhea and rectal pain.   Endocrine: Negative.    Genitourinary: Negative.  Negative for decreased urine volume, difficulty urinating, dysuria, flank pain, hematuria and urgency.   Musculoskeletal: Negative for back pain and gait problem.   Skin: Negative.  Negative for wound.   Allergic/Immunologic: Negative.    Neurological: Negative for dizziness, syncope and light-headedness.   Hematological: Negative.    Psychiatric/Behavioral: Positive for dysphoric mood. Negative for agitation, behavioral problems, self-injury, sleep disturbance and suicidal ideas. The patient is not nervous/anxious.      14 Point ROS completed with pertinent positives discussed. All other systems reviewed and are negative.       Objective   Encounter Vitals  /82  Pulse 93  Temp 99.3 °F (37.4 °C) (Tympanic)   Ht 157.5 cm (62\")  SpO2 90%    Physical Exam   Constitutional: She is oriented to person, place, and time. She appears well-developed and well-nourished.  Non-toxic appearance.   abd vitals   HENT:   Head: Normocephalic and atraumatic.   Right Ear: Tympanic membrane, external ear and ear canal normal.   Left Ear: Tympanic membrane, external ear and ear canal normal.   Nose: Nose normal.   Mouth/Throat: Uvula is midline, oropharynx is clear and moist and mucous membranes are normal. Abnormal dentition. No posterior oropharyngeal edema or posterior oropharyngeal erythema.   Eyes: Lids are normal. Pupils " are equal, round, and reactive to light.   Neck: Trachea normal and normal range of motion. Neck supple. No thyroid mass present.   Cardiovascular: Normal rate, regular rhythm, S1 normal, S2 normal, normal heart sounds and intact distal pulses.   No extrasystoles are present. Exam reveals no gallop and no friction rub.    No murmur heard.  HTN     Pulmonary/Chest: Effort normal. No respiratory distress. She has no wheezes. She has no rhonchi. She has rales in the right lower field and the left lower field.   Abdominal: Soft. Normal appearance and bowel sounds are normal. She exhibits no mass. There is no rebound and no guarding.   Musculoskeletal: Normal range of motion.   Lymphadenopathy:     She has no cervical adenopathy.   Neurological: She is alert and oriented to person, place, and time. She has normal strength. No cranial nerve deficit or sensory deficit. Gait normal.   Skin: Skin is warm and dry. No rash noted.   Psychiatric: She has a normal mood and affect. Her speech is normal and behavior is normal. Judgment and thought content normal. Cognition and memory are normal.   Nursing note and vitals reviewed.      Pertinent Labs  No visits with results within 3 Month(s) from this visit.  Latest known visit with results is:    Transcribe Orders on 03/30/2017   Component Date Value Ref Range Status   • Glucose 03/30/2017 91  60 - 100 mg/dL Final   • BUN 03/30/2017 17  7 - 21 mg/dL Final   • Creatinine 03/30/2017 1.47* 0.50 - 1.00 mg/dL Final   • Sodium 03/30/2017 136* 137 - 145 mmol/L Final   • Potassium 03/30/2017 4.8  3.5 - 5.1 mmol/L Final   • Chloride 03/30/2017 97  95 - 110 mmol/L Final   • CO2 03/30/2017 26.0  22.0 - 31.0 mmol/L Final   • Calcium 03/30/2017 8.8  8.4 - 10.2 mg/dL Final   • eGFR Non African Amer 03/30/2017 36* >60 mL/min/1.73 Final   • BUN/Creatinine Ratio 03/30/2017 11.6  7.0 - 25.0 Final   • Anion Gap 03/30/2017 13.0  5.0 - 15.0 mmol/L Final   • Oxcarbazepine 03/30/2017 None Detected  10 -  35 ug/mL Final                                    Detection Limit = 1   • WBC 03/30/2017 3.66  3.20 - 9.80 10*3/mm3 Final   • RBC 03/30/2017 4.22  3.77 - 5.16 10*6/mm3 Final   • Hemoglobin 03/30/2017 11.9* 12.0 - 15.5 g/dL Final   • Hematocrit 03/30/2017 34.9* 35.0 - 45.0 % Final   • MCV 03/30/2017 82.7  80.0 - 98.0 fL Final   • MCH 03/30/2017 28.2  26.5 - 34.0 pg Final   • MCHC 03/30/2017 34.1  31.4 - 36.0 g/dL Final   • RDW 03/30/2017 13.5  11.5 - 14.5 % Final   • RDW-SD 03/30/2017 40.8  36.4 - 46.3 fl Final   • MPV 03/30/2017 9.3  8.0 - 12.0 fL Final   • Platelets 03/30/2017 206  150 - 450 10*3/mm3 Final   • Neutrophil % 03/30/2017 38.0  37.0 - 80.0 % Final   • Lymphocyte % 03/30/2017 41.0  10.0 - 50.0 % Final   • Monocyte % 03/30/2017 12.0  0.0 - 12.0 % Final   • Eosinophil % 03/30/2017 9.0* 0.0 - 7.0 % Final   • Neutrophils Absolute 03/30/2017 1.39* 2.00 - 8.60 10*3/mm3 Final   • Lymphocytes Absolute 03/30/2017 1.50  0.60 - 4.20 10*3/mm3 Final   • Monocytes Absolute 03/30/2017 0.44  0.00 - 0.90 10*3/mm3 Final   • Eosinophils Absolute 03/30/2017 0.33  0.00 - 0.70 10*3/mm3 Final   • RBC Morphology 03/30/2017 Normal  Normal Final   • WBC Morphology 03/30/2017 Normal  Normal Final   • Platelet Morphology 03/30/2017 Normal  Normal Final     Labs have been independently reviewed    Key Imaging/Tracings/POC Testing    Assessment and Medications  Problems Addressed this Visit        Cardiovascular and Mediastinum    Essential hypertension - Primary    Relevant Medications    carvedilol (COREG) 12.5 MG tablet    Other Relevant Orders    Lipid Panel    Hemoglobin A1c    Comprehensive Metabolic Panel    TSH    Vitamin B12    CBC & Differential    Chronic congestive heart failure    Relevant Medications    carvedilol (COREG) 12.5 MG tablet    Other Relevant Orders    BNP    Irregular heart rhythm       Respiratory    Pulmonary emphysema       Nervous and Auditory    Idiopathic peripheral neuropathy       Genitourinary     CKD (chronic kidney disease) stage 3, GFR 30-59 ml/min       Other    Anxiety        Side effects of ordered medications discussed with patient.     Plan/Additional Notes/Instructions  Plan   1. [facility] records reviewed  2. Increase coreg to 12.5 BID per [facility]  3. Will fill out fmla paperwork for son to  tomorrow  4. F/u PRN    Follow-Up  Return in about 6 months (around 6/13/2018), or if symptoms worsen or fail to improve, for With full labs.    Patient/caregiver verbalizes understanding of all orders and instructions in this plan of care.       This document has been electronically signed by DANIELE Carpenter on December 13, 2017 12:54 PM

## 2017-12-28 NOTE — TELEPHONE ENCOUNTER
WANTS TO KNOW IF YOU WILL CALL IN NICOTINE PATCHES 21MG TO [facility]. CHAI GAVE HER A PRESCRIPTION WITH NO REFILLS; PT IS OUT. HAS AN APPT 01/02/18.

## 2018-01-01 ENCOUNTER — TELEPHONE (OUTPATIENT)
Dept: FAMILY MEDICINE CLINIC | Facility: CLINIC | Age: 63
End: 2018-01-01

## 2018-01-01 ENCOUNTER — OFFICE VISIT (OUTPATIENT)
Dept: FAMILY MEDICINE CLINIC | Facility: CLINIC | Age: 63
End: 2018-01-01

## 2018-01-01 VITALS
SYSTOLIC BLOOD PRESSURE: 130 MMHG | HEIGHT: 62 IN | DIASTOLIC BLOOD PRESSURE: 82 MMHG | HEART RATE: 91 BPM | TEMPERATURE: 98.5 F | BODY MASS INDEX: 24.11 KG/M2 | OXYGEN SATURATION: 96 % | WEIGHT: 131 LBS

## 2018-01-01 VITALS
SYSTOLIC BLOOD PRESSURE: 142 MMHG | RESPIRATION RATE: 20 BRPM | TEMPERATURE: 96.4 F | HEIGHT: 62 IN | BODY MASS INDEX: 23.99 KG/M2 | OXYGEN SATURATION: 95 % | HEART RATE: 101 BPM | WEIGHT: 130.38 LBS | DIASTOLIC BLOOD PRESSURE: 86 MMHG

## 2018-01-01 VITALS
HEART RATE: 87 BPM | SYSTOLIC BLOOD PRESSURE: 122 MMHG | TEMPERATURE: 98.9 F | DIASTOLIC BLOOD PRESSURE: 76 MMHG | WEIGHT: 130 LBS | HEIGHT: 62 IN | BODY MASS INDEX: 23.92 KG/M2 | OXYGEN SATURATION: 96 %

## 2018-01-01 VITALS
HEART RATE: 98 BPM | OXYGEN SATURATION: 99 % | RESPIRATION RATE: 20 BRPM | TEMPERATURE: 96.9 F | BODY MASS INDEX: 23.92 KG/M2 | HEIGHT: 62 IN | WEIGHT: 130 LBS

## 2018-01-01 DIAGNOSIS — I10 ESSENTIAL HYPERTENSION: ICD-10-CM

## 2018-01-01 DIAGNOSIS — J06.9 UPPER RESPIRATORY TRACT INFECTION, UNSPECIFIED TYPE: Primary | ICD-10-CM

## 2018-01-01 DIAGNOSIS — R11.14 BILIOUS VOMITING WITH NAUSEA: ICD-10-CM

## 2018-01-01 DIAGNOSIS — R60.0 EDEMA OF RIGHT FOOT: ICD-10-CM

## 2018-01-01 DIAGNOSIS — R30.0 DYSURIA: ICD-10-CM

## 2018-01-01 DIAGNOSIS — G60.9 IDIOPATHIC PERIPHERAL NEUROPATHY: Primary | ICD-10-CM

## 2018-01-01 DIAGNOSIS — L53.9 ERYTHEMA OF FOOT: ICD-10-CM

## 2018-01-01 DIAGNOSIS — R10.84 GENERALIZED ABDOMINAL PAIN: Primary | ICD-10-CM

## 2018-01-01 DIAGNOSIS — M79.661 PAIN AND SWELLING OF LOWER LEG, RIGHT: Primary | ICD-10-CM

## 2018-01-01 DIAGNOSIS — M79.671 PAIN IN RIGHT FOOT: ICD-10-CM

## 2018-01-01 DIAGNOSIS — R26.89 INABILITY TO BEAR WEIGHT: ICD-10-CM

## 2018-01-01 DIAGNOSIS — J06.9 UPPER RESPIRATORY TRACT INFECTION, UNSPECIFIED TYPE: ICD-10-CM

## 2018-01-01 DIAGNOSIS — I50.9 CHRONIC CONGESTIVE HEART FAILURE, UNSPECIFIED CONGESTIVE HEART FAILURE TYPE: ICD-10-CM

## 2018-01-01 DIAGNOSIS — Z20.818 STREP THROAT EXPOSURE: ICD-10-CM

## 2018-01-01 DIAGNOSIS — M79.89 PAIN AND SWELLING OF LOWER LEG, RIGHT: Primary | ICD-10-CM

## 2018-01-01 DIAGNOSIS — J44.9 CHRONIC OBSTRUCTIVE PULMONARY DISEASE, UNSPECIFIED COPD TYPE (HCC): ICD-10-CM

## 2018-01-01 DIAGNOSIS — R26.9 ALTERED GAIT: ICD-10-CM

## 2018-01-01 DIAGNOSIS — N18.30 CKD (CHRONIC KIDNEY DISEASE) STAGE 3, GFR 30-59 ML/MIN (HCC): ICD-10-CM

## 2018-01-01 LAB
ALBUMIN SERPL-MCNC: 3.8 G/DL (ref 3.2–5.5)
ALBUMIN SERPL-MCNC: 4.1 G/DL (ref 3.2–5.5)
ALBUMIN/GLOB SERPL: 1.1 G/DL (ref 1–3)
ALBUMIN/GLOB SERPL: 1.2 G/DL (ref 1–3)
ALP SERPL-CCNC: 63 U/L (ref 15–121)
ALP SERPL-CCNC: 65 U/L (ref 15–121)
ALT SERPL W P-5'-P-CCNC: 12 U/L (ref 10–60)
ALT SERPL W P-5'-P-CCNC: 14 U/L (ref 10–60)
AMYLASE SERPL-CCNC: 44 U/L (ref 25–140)
ANION GAP SERPL CALCULATED.3IONS-SCNC: 10 MMOL/L (ref 5–15)
ANION GAP SERPL CALCULATED.3IONS-SCNC: 12 MMOL/L (ref 5–15)
AST SERPL-CCNC: 24 U/L (ref 10–60)
AST SERPL-CCNC: 25 U/L (ref 10–60)
BACTERIA SPEC AEROBE CULT: ABNORMAL
BACTERIA SPEC AEROBE CULT: ABNORMAL
BACTERIA UR QL AUTO: ABNORMAL /HPF
BASOPHILS # BLD AUTO: 0.03 10*3/MM3 (ref 0–0.2)
BASOPHILS NFR BLD AUTO: 0.4 % (ref 0–2)
BILIRUB BLD-MCNC: ABNORMAL MG/DL
BILIRUB SERPL-MCNC: 1.3 MG/DL (ref 0.2–1)
BILIRUB SERPL-MCNC: 1.9 MG/DL (ref 0.2–1)
BILIRUB UR QL STRIP: NEGATIVE
BUN BLD-MCNC: 15 MG/DL (ref 8–25)
BUN BLD-MCNC: 40 MG/DL (ref 8–25)
BUN/CREAT SERPL: 19 (ref 7–25)
BUN/CREAT SERPL: 8.3 (ref 7–25)
CALCIUM SPEC-SCNC: 9.2 MG/DL (ref 8.4–10.8)
CALCIUM SPEC-SCNC: 9.4 MG/DL (ref 8.4–10.8)
CHLORIDE SERPL-SCNC: 89 MMOL/L (ref 100–112)
CHLORIDE SERPL-SCNC: 97 MMOL/L (ref 100–112)
CLARITY UR: ABNORMAL
CLARITY, POC: CLEAR
CO2 SERPL-SCNC: 29 MMOL/L (ref 20–32)
CO2 SERPL-SCNC: 33 MMOL/L (ref 20–32)
COLOR UR: YELLOW
COLOR UR: YELLOW
CREAT BLD-MCNC: 1.8 MG/DL (ref 0.4–1.3)
CREAT BLD-MCNC: 2.1 MG/DL (ref 0.4–1.3)
DEPRECATED RDW RBC AUTO: 45.3 FL (ref 36.4–46.3)
EOSINOPHIL # BLD AUTO: 0.87 10*3/MM3 (ref 0–0.7)
EOSINOPHIL NFR BLD AUTO: 11.3 % (ref 0–7)
ERYTHROCYTE [DISTWIDTH] IN BLOOD BY AUTOMATED COUNT: 16.1 % (ref 11.5–14.5)
EXPIRATION DATE: NORMAL
GFR SERPL CREATININE-BSD FRML MDRD: 24 ML/MIN/1.73 (ref 45–104)
GFR SERPL CREATININE-BSD FRML MDRD: 28 ML/MIN/1.73 (ref 45–104)
GLOBULIN UR ELPH-MCNC: 3.4 GM/DL (ref 2.5–4.6)
GLOBULIN UR ELPH-MCNC: 3.5 GM/DL (ref 2.5–4.6)
GLUCOSE BLD-MCNC: 112 MG/DL (ref 70–100)
GLUCOSE BLD-MCNC: 142 MG/DL (ref 70–100)
GLUCOSE UR STRIP-MCNC: NEGATIVE MG/DL
GLUCOSE UR STRIP-MCNC: NEGATIVE MG/DL
H PYLORI IGG SER IA-ACNC: NEGATIVE
HCT VFR BLD AUTO: 42.2 % (ref 35–45)
HGB BLD-MCNC: 14.5 G/DL (ref 12–15.5)
HGB UR QL STRIP.AUTO: NEGATIVE
HYALINE CASTS UR QL AUTO: ABNORMAL /LPF
INTERNAL CONTROL: NORMAL
KETONES UR QL STRIP: NEGATIVE
KETONES UR QL: NEGATIVE
LEUKOCYTE EST, POC: ABNORMAL
LEUKOCYTE ESTERASE UR QL STRIP.AUTO: ABNORMAL
LYMPHOCYTES # BLD AUTO: 1.71 10*3/MM3 (ref 0.6–4.2)
LYMPHOCYTES NFR BLD AUTO: 22.2 % (ref 10–50)
Lab: NORMAL
MCH RBC QN AUTO: 26.7 PG (ref 26.5–34)
MCHC RBC AUTO-ENTMCNC: 34.4 G/DL (ref 31.4–36)
MCV RBC AUTO: 77.7 FL (ref 80–98)
MONOCYTES # BLD AUTO: 0.68 10*3/MM3 (ref 0–0.9)
MONOCYTES NFR BLD AUTO: 8.8 % (ref 0–12)
NEUTROPHILS # BLD AUTO: 4.42 10*3/MM3 (ref 2–8.6)
NEUTROPHILS NFR BLD AUTO: 57.3 % (ref 37–80)
NITRITE UR QL STRIP: NEGATIVE
NITRITE UR-MCNC: NEGATIVE MG/ML
PH UR STRIP.AUTO: 5.5 [PH] (ref 5.5–8)
PH UR: 5.5 [PH] (ref 5–8)
PLATELET # BLD AUTO: 136 10*3/MM3 (ref 150–450)
PMV BLD AUTO: 9 FL (ref 8–12)
POTASSIUM BLD-SCNC: 3.7 MMOL/L (ref 3.4–5.4)
POTASSIUM BLD-SCNC: 4.1 MMOL/L (ref 3.4–5.4)
PROT SERPL-MCNC: 7.2 G/DL (ref 6.7–8.2)
PROT SERPL-MCNC: 7.6 G/DL (ref 6.7–8.2)
PROT UR QL STRIP: NEGATIVE
PROT UR STRIP-MCNC: ABNORMAL MG/DL
RBC # BLD AUTO: 5.43 10*6/MM3 (ref 3.77–5.16)
RBC # UR STRIP: NEGATIVE /UL
RBC # UR: ABNORMAL /HPF
REF LAB TEST METHOD: ABNORMAL
S PYO AG THROAT QL: NEGATIVE
SODIUM BLD-SCNC: 134 MMOL/L (ref 134–146)
SODIUM BLD-SCNC: 136 MMOL/L (ref 134–146)
SP GR UR STRIP: 1.01 (ref 1–1.03)
SP GR UR: 10.15 (ref 1–1.03)
SQUAMOUS #/AREA URNS HPF: ABNORMAL /HPF
URATE SERPL-MCNC: 11.1 MG/DL (ref 2.6–7.2)
UROBILINOGEN UR QL STRIP: ABNORMAL
UROBILINOGEN UR QL: NORMAL
WBC NRBC COR # BLD: 7.71 10*3/MM3 (ref 3.2–9.8)
WBC UR QL AUTO: ABNORMAL /HPF

## 2018-01-01 PROCEDURE — 99214 OFFICE O/P EST MOD 30 MIN: CPT | Performed by: NURSE PRACTITIONER

## 2018-01-01 PROCEDURE — 87880 STREP A ASSAY W/OPTIC: CPT | Performed by: NURSE PRACTITIONER

## 2018-01-01 PROCEDURE — 80053 COMPREHEN METABOLIC PANEL: CPT | Performed by: NURSE PRACTITIONER

## 2018-01-01 PROCEDURE — 99213 OFFICE O/P EST LOW 20 MIN: CPT | Performed by: NURSE PRACTITIONER

## 2018-01-01 PROCEDURE — 87186 SC STD MICRODIL/AGAR DIL: CPT | Performed by: NURSE PRACTITIONER

## 2018-01-01 PROCEDURE — 85025 COMPLETE CBC W/AUTO DIFF WBC: CPT | Performed by: NURSE PRACTITIONER

## 2018-01-01 PROCEDURE — 81001 URINALYSIS AUTO W/SCOPE: CPT | Performed by: NURSE PRACTITIONER

## 2018-01-01 PROCEDURE — 82150 ASSAY OF AMYLASE: CPT | Performed by: NURSE PRACTITIONER

## 2018-01-01 PROCEDURE — 86677 HELICOBACTER PYLORI ANTIBODY: CPT | Performed by: NURSE PRACTITIONER

## 2018-01-01 PROCEDURE — 87077 CULTURE AEROBIC IDENTIFY: CPT | Performed by: NURSE PRACTITIONER

## 2018-01-01 PROCEDURE — 87086 URINE CULTURE/COLONY COUNT: CPT | Performed by: NURSE PRACTITIONER

## 2018-01-01 PROCEDURE — 84550 ASSAY OF BLOOD/URIC ACID: CPT | Performed by: NURSE PRACTITIONER

## 2018-01-01 RX ORDER — PANTOPRAZOLE SODIUM 40 MG/1
40 TABLET, DELAYED RELEASE ORAL DAILY
Qty: 30 TABLET | Refills: 5 | Status: SHIPPED | OUTPATIENT
Start: 2018-01-01

## 2018-01-01 RX ORDER — PREDNISONE 20 MG/1
20 TABLET ORAL 2 TIMES DAILY
Qty: 10 TABLET | Refills: 0 | Status: SHIPPED | OUTPATIENT
Start: 2018-01-01 | End: 2018-01-01 | Stop reason: SDUPTHER

## 2018-01-01 RX ORDER — CLOPIDOGREL BISULFATE 75 MG/1
75 TABLET ORAL DAILY
Qty: 30 TABLET
Start: 2018-01-01

## 2018-01-01 RX ORDER — RANITIDINE 150 MG/1
TABLET ORAL
COMMUNITY
Start: 2018-01-01

## 2018-01-01 RX ORDER — PREDNISONE 20 MG/1
TABLET ORAL
Qty: 10 TABLET | Refills: 0 | Status: SHIPPED | OUTPATIENT
Start: 2018-01-01

## 2018-01-01 RX ORDER — ALBUTEROL SULFATE 2.5 MG/3ML
SOLUTION RESPIRATORY (INHALATION)
COMMUNITY
Start: 2018-01-01

## 2018-01-01 RX ORDER — NICOTINE 21 MG/24H
PATCH, EXTENDED RELEASE TRANSDERMAL
Qty: 28 PATCH | Refills: 5 | Status: SHIPPED | OUTPATIENT
Start: 2018-01-01

## 2018-01-01 RX ORDER — ONDANSETRON 4 MG/1
TABLET, ORALLY DISINTEGRATING ORAL
COMMUNITY
Start: 2018-01-01

## 2018-01-01 RX ORDER — AZITHROMYCIN 250 MG/1
TABLET, FILM COATED ORAL
Qty: 6 TABLET | Refills: 0 | Status: SHIPPED | OUTPATIENT
Start: 2018-01-01 | End: 2018-01-01 | Stop reason: SDUPTHER

## 2018-01-01 RX ORDER — NICOTINE 21 MG/24H
PATCH, EXTENDED RELEASE TRANSDERMAL
COMMUNITY
Start: 2017-01-01 | End: 2018-01-01 | Stop reason: SDUPTHER

## 2018-01-01 RX ORDER — PROMETHAZINE HYDROCHLORIDE 25 MG/1
25 TABLET ORAL EVERY 6 HOURS PRN
Qty: 30 TABLET | Refills: 1 | Status: SHIPPED | OUTPATIENT
Start: 2018-01-01

## 2018-01-01 RX ORDER — GABAPENTIN 300 MG/1
300 CAPSULE ORAL 3 TIMES DAILY
Qty: 90 CAPSULE | Refills: 5 | Status: SHIPPED | OUTPATIENT
Start: 2018-01-01

## 2018-01-01 RX ORDER — NICOTINE 21 MG/24H
1 PATCH, EXTENDED RELEASE TRANSDERMAL EVERY 24 HOURS
Qty: 28 PATCH | Refills: 0 | Status: SHIPPED | OUTPATIENT
Start: 2018-01-01 | End: 2018-01-01 | Stop reason: SDUPTHER

## 2018-01-01 RX ORDER — AZITHROMYCIN 250 MG/1
TABLET, FILM COATED ORAL
Qty: 6 TABLET | Refills: 0 | Status: SHIPPED | OUTPATIENT
Start: 2018-01-01

## 2018-01-02 NOTE — PROGRESS NOTES
"Chief Complaint   Patient presents with   • Foot Pain     tingling and pain in both feets, refill gabapentin    • Leg Pain     both legs        Subjective   Mariela Torres is a 62 y.o. female presents to the office for re-evaluation of BLE tingling and numbness X several years.    PMH  HTN- managed with carvedilol- poorly controlled- med increased to 12.5 BID at [facility]    CHF- managed with lasix, aldactone- poorly controlled    COPD- managed with albuterol, incruse-poorly controlled    Peripheral neuropathy- managed with gabapentin    Hypokalemia- managed with Kclor    GERD- daily zantac, poorly controlled  Will switch to protonix today    HPI   Patient presents for re-evaluation and refill of controlled medication, gabapentin. She has been taking gabapentin for several years with good relief of paraesthesia. She denies tingling and numbness today. She does c/o of \"all over pain\" in her legs. This has been present for several years, however it has improved since starting gabapentin.     [facility] reviewed  No UDS on file    HPI  Family History   Problem Relation Age of Onset   • Diabetes Mother    • Cancer Maternal Grandmother    • Cancer Maternal Grandfather      Social History     Social History   • Marital status:      Spouse name: N/A   • Number of children: N/A   • Years of education: N/A     Occupational History   • Not on file.     Social History Main Topics   • Smoking status: Current Every Day Smoker     Packs/day: 0.50     Years: 45.00     Types: Cigarettes   • Smokeless tobacco: Current User     Types: Snuff      Comment: currently using patches    • Alcohol use No   • Drug use: No   • Sexual activity: No     Other Topics Concern   • Not on file     Social History Narrative   • No narrative on file       The following portions of the patient's history were reviewed and updated as appropriate: allergies, current medications, past family history, past medical history, past social history, past surgical " "history and problem list.    Review of Systems   Constitutional: Negative for activity change, appetite change and unexpected weight change.   HENT: Negative.  Negative for ear pain, postnasal drip, rhinorrhea and sinus pressure.    Eyes: Negative.  Negative for pain and redness.   Respiratory: Negative for choking, chest tightness, shortness of breath and wheezing.    Cardiovascular: Negative for palpitations and leg swelling.   Gastrointestinal: Negative.  Negative for abdominal distention, constipation, diarrhea and rectal pain.   Endocrine: Negative.    Genitourinary: Negative.  Negative for decreased urine volume, difficulty urinating, dysuria, flank pain, hematuria and urgency.   Musculoskeletal: Positive for arthralgias and myalgias. Negative for back pain and gait problem.   Skin: Negative.  Negative for wound.   Allergic/Immunologic: Negative.    Neurological: Positive for numbness. Negative for dizziness, syncope and light-headedness.   Hematological: Negative.    Psychiatric/Behavioral: Negative for agitation, behavioral problems, dysphoric mood, self-injury, sleep disturbance and suicidal ideas. The patient is not nervous/anxious.      14 Point ROS completed with pertinent positives discussed. All other systems reviewed and are negative.       Objective   Encounter Vitals  /82  Pulse 91  Temp 98.5 °F (36.9 °C) (Tympanic)   Ht 157.5 cm (62\")  Wt 59.4 kg (131 lb)  SpO2 96%  Breastfeeding? No  BMI 23.96 kg/m2    Physical Exam   Constitutional: She is oriented to person, place, and time. Vital signs are normal. She appears well-developed and well-nourished.  Non-toxic appearance.   HENT:   Head: Normocephalic and atraumatic.   Right Ear: Tympanic membrane, external ear and ear canal normal.   Left Ear: Tympanic membrane, external ear and ear canal normal.   Nose: Nose normal.   Mouth/Throat: Uvula is midline, oropharynx is clear and moist and mucous membranes are normal. Abnormal dentition. No " posterior oropharyngeal edema or posterior oropharyngeal erythema.   Eyes: Lids are normal. Pupils are equal, round, and reactive to light.   Neck: Trachea normal and normal range of motion. Neck supple. No thyroid mass present.   Cardiovascular: Normal rate, regular rhythm, S1 normal, S2 normal and intact distal pulses.   No extrasystoles are present. Exam reveals no gallop and no friction rub.    Murmur heard.   Systolic murmur is present with a grade of 2/6   HTN     Pulmonary/Chest: Effort normal. No respiratory distress. She has no wheezes. She has no rhonchi. She has no rales.   Abdominal: Soft. Normal appearance and bowel sounds are normal. She exhibits no mass. There is no rebound and no guarding.   Musculoskeletal: Normal range of motion.   Lymphadenopathy:     She has no cervical adenopathy.   Neurological: She is alert and oriented to person, place, and time. She has normal strength. No cranial nerve deficit or sensory deficit. Gait normal.   Skin: Skin is warm and dry. No rash noted.   Psychiatric: She has a normal mood and affect. Her speech is normal and behavior is normal. Judgment and thought content normal. Cognition and memory are normal.   Nursing note and vitals reviewed.      Pertinent Labs  No visits with results within 3 Month(s) from this visit.  Latest known visit with results is:    Transcribe Orders on 03/30/2017   Component Date Value Ref Range Status   • Glucose 03/30/2017 91  60 - 100 mg/dL Final   • BUN 03/30/2017 17  7 - 21 mg/dL Final   • Creatinine 03/30/2017 1.47* 0.50 - 1.00 mg/dL Final   • Sodium 03/30/2017 136* 137 - 145 mmol/L Final   • Potassium 03/30/2017 4.8  3.5 - 5.1 mmol/L Final   • Chloride 03/30/2017 97  95 - 110 mmol/L Final   • CO2 03/30/2017 26.0  22.0 - 31.0 mmol/L Final   • Calcium 03/30/2017 8.8  8.4 - 10.2 mg/dL Final   • eGFR Non African Amer 03/30/2017 36* >60 mL/min/1.73 Final   • BUN/Creatinine Ratio 03/30/2017 11.6  7.0 - 25.0 Final   • Anion Gap 03/30/2017  13.0  5.0 - 15.0 mmol/L Final   • Oxcarbazepine 03/30/2017 None Detected  10 - 35 ug/mL Final                                    Detection Limit = 1   • WBC 03/30/2017 3.66  3.20 - 9.80 10*3/mm3 Final   • RBC 03/30/2017 4.22  3.77 - 5.16 10*6/mm3 Final   • Hemoglobin 03/30/2017 11.9* 12.0 - 15.5 g/dL Final   • Hematocrit 03/30/2017 34.9* 35.0 - 45.0 % Final   • MCV 03/30/2017 82.7  80.0 - 98.0 fL Final   • MCH 03/30/2017 28.2  26.5 - 34.0 pg Final   • MCHC 03/30/2017 34.1  31.4 - 36.0 g/dL Final   • RDW 03/30/2017 13.5  11.5 - 14.5 % Final   • RDW-SD 03/30/2017 40.8  36.4 - 46.3 fl Final   • MPV 03/30/2017 9.3  8.0 - 12.0 fL Final   • Platelets 03/30/2017 206  150 - 450 10*3/mm3 Final   • Neutrophil % 03/30/2017 38.0  37.0 - 80.0 % Final   • Lymphocyte % 03/30/2017 41.0  10.0 - 50.0 % Final   • Monocyte % 03/30/2017 12.0  0.0 - 12.0 % Final   • Eosinophil % 03/30/2017 9.0* 0.0 - 7.0 % Final   • Neutrophils Absolute 03/30/2017 1.39* 2.00 - 8.60 10*3/mm3 Final   • Lymphocytes Absolute 03/30/2017 1.50  0.60 - 4.20 10*3/mm3 Final   • Monocytes Absolute 03/30/2017 0.44  0.00 - 0.90 10*3/mm3 Final   • Eosinophils Absolute 03/30/2017 0.33  0.00 - 0.70 10*3/mm3 Final   • RBC Morphology 03/30/2017 Normal  Normal Final   • WBC Morphology 03/30/2017 Normal  Normal Final   • Platelet Morphology 03/30/2017 Normal  Normal Final     Labs have been independently reviewed    Key Imaging/Tracings/POC Testing    Assessment and Medications  Problems Addressed this Visit        Cardiovascular and Mediastinum    Essential hypertension    Chronic congestive heart failure       Nervous and Auditory    Idiopathic peripheral neuropathy - Primary       Genitourinary    CKD (chronic kidney disease) stage 3, GFR 30-59 ml/min        Side effects of ordered medications discussed with patient.     Plan/Additional Notes/Instructions  Plan   1. Stop zantac  2. Change to protonix for GERD  3. abraham reviewed  4. uds at future visit  5. Gabapentin  refilled today  6. F/u as previously schedule, or 6 mos with labs and PRN    Follow-Up  Return if symptoms worsen or fail to improve.    Patient/caregiver verbalizes understanding of all orders and instructions in this plan of care.           This document has been electronically signed by DANIELE Carpenter on January 2, 2018 9:44 AM

## 2018-01-24 NOTE — PROGRESS NOTES
"Chief Complaint   Patient presents with   • Vomiting     x 1 day and once last week    • Nausea       Subjective   Mariela Torres is a 62 y.o. female presents to the office for evaluation of nausea X 1 week and vomiting X 1 day.    PMH  HTN- managed with carvedilol- well controlled    CHF- managed with lasix, aldactone- well controlled controlled    COPD- managed with albuterol, incruse- well controlled    Peripheral neuropathy- managed with gabapentin- well managed    Hypokalemia- managed with Kclor    GERD- well controlled with protonix    Chronic a fib- plavix?     HPI   Patient presents with a 1 week history of terrible nausea and emesis X 1. She states her stomach hurts \"all the time\". She states \"sometimes are worse than others\". She has taken phenergan with good relief of nausea, however she ran out. She has also been taking pepto-bismol 1-2 x daily X 1 week. She states pepto is no longer effective.   She threw up this morning X 1 and states it was bile. She denies coffee ground emesis, hematemesis,  diarrhea, hematochezia, melena, and fever. Associated symptoms include dizziness which is relieved by lying down, and loss of appetite. She has tried to eat and denies worsening of abd pain with eating, however she states, \"I just lose my taste for food. I made roast, gravy, and potatoes yesterday and couldn't eat any of it because nothing tastes good\".    No new medications. She does state she needs to add \"plavix 40 mg\" to her med list. She has been taking this \"for a long time\". She does not know who started her on this nor why she is taking it.      She had her appendix removed when she had her hysterectomy. She still has her gall bladder.     She is also requesting to see Dr. Hoover for cardiology f/u because \"I never saw him again after I was discharged from the hospital\".     Vomiting    This is a new problem. The current episode started today. The problem occurs less than 2 times per day. The problem " has been unchanged. The emesis has an appearance of bile. There has been no fever. Associated symptoms include dizziness. Pertinent negatives include no abdominal pain, chest pain, chills, coughing, diarrhea, fever or headaches. She has tried diet change for the symptoms. The treatment provided no relief.   Nausea   This is a new problem. The current episode started 1 to 4 weeks ago. The problem occurs constantly. The problem has been unchanged. Associated symptoms include nausea, vertigo and vomiting. Pertinent negatives include no abdominal pain, chest pain, chills, congestion, coughing, fatigue, fever, headaches, neck pain, numbness, rash, sore throat or weakness. The symptoms are aggravated by eating. She has tried rest (phenergan, pepto-bismol) for the symptoms. The treatment provided moderate relief.     Family History   Problem Relation Age of Onset   • Diabetes Mother    • Cancer Maternal Grandmother    • Cancer Maternal Grandfather      Social History     Social History   • Marital status:      Spouse name: N/A   • Number of children: N/A   • Years of education: N/A     Occupational History   • Not on file.     Social History Main Topics   • Smoking status: Current Every Day Smoker     Packs/day: 0.50     Years: 45.00     Types: Cigarettes   • Smokeless tobacco: Current User     Types: Snuff      Comment: currently using patches    • Alcohol use No   • Drug use: No   • Sexual activity: No     Other Topics Concern   • Not on file     Social History Narrative       The following portions of the patient's history were reviewed and updated as appropriate: allergies, current medications, past family history, past medical history, past social history, past surgical history and problem list.    Review of Systems   Constitutional: Negative.  Negative for activity change, chills, fatigue, fever and unexpected weight change.   HENT: Negative.  Negative for congestion, ear pain, postnasal drip, rhinorrhea,  "sinus pressure and sore throat.    Eyes: Negative.  Negative for pain and redness.   Respiratory: Negative.  Negative for cough, choking, chest tightness, shortness of breath and wheezing.    Cardiovascular: Negative.  Negative for chest pain, palpitations and leg swelling.   Gastrointestinal: Positive for nausea and vomiting. Negative for abdominal distention, abdominal pain, constipation, diarrhea and rectal pain.   Endocrine: Negative.    Genitourinary: Negative.  Negative for decreased urine volume, difficulty urinating, dysuria, flank pain, hematuria and urgency.   Musculoskeletal: Negative.  Negative for gait problem and neck pain.   Skin: Negative.  Negative for rash and wound.   Allergic/Immunologic: Negative.    Neurological: Positive for dizziness and vertigo. Negative for syncope, weakness, light-headedness, numbness and headaches.   Hematological: Negative.    Psychiatric/Behavioral: Negative.  Negative for agitation, behavioral problems, confusion, self-injury, sleep disturbance and suicidal ideas. The patient is not nervous/anxious.      14 Point ROS completed with pertinent positives discussed. All other systems reviewed and are negative.       Objective   Encounter Vitals  /76  Pulse 87  Temp 98.9 °F (37.2 °C) (Tympanic)   Ht 157.5 cm (62\")  Wt 59 kg (130 lb)  SpO2 96%  BMI 23.78 kg/m2  Vitals:    01/24/18 1039   BP: 122/76   Pulse: 87   Temp: 98.9 °F (37.2 °C)   TempSrc: Tympanic   SpO2: 96%   Weight: 59 kg (130 lb)   Height: 157.5 cm (62\")       Physical Exam   Constitutional: She is oriented to person, place, and time. Vital signs are normal. She appears well-developed and well-nourished.  Non-toxic appearance. She does not appear ill. She appears distressed.   Patient is sitting leaning forward and states she feels as if she could throw up. Emesis bag given to patient   HENT:   Head: Normocephalic and atraumatic.   Right Ear: Tympanic membrane, external ear and ear canal normal. "   Left Ear: Tympanic membrane, external ear and ear canal normal.   Nose: Nose normal.   Mouth/Throat: Uvula is midline, oropharynx is clear and moist and mucous membranes are normal. Abnormal dentition. No posterior oropharyngeal edema or posterior oropharyngeal erythema.   Eyes: Lids are normal. Pupils are equal, round, and reactive to light.   Neck: Trachea normal and normal range of motion. Neck supple. No thyroid mass present.   Cardiovascular: Normal rate, regular rhythm, S1 normal, S2 normal and intact distal pulses.   No extrasystoles are present. Exam reveals no gallop and no friction rub.    Murmur heard.   Systolic murmur is present with a grade of 2/6   Pulmonary/Chest: Effort normal. No respiratory distress. She has no wheezes. She has no rhonchi. She has no rales.   Abdominal: Soft. Normal appearance and bowel sounds are normal. She exhibits no mass. There is generalized tenderness and tenderness in the left lower quadrant. There is CVA tenderness. There is no rigidity, no rebound, no guarding, no tenderness at McBurney's point and negative Pacheco's sign.   Right CVA tenderness.   increased pain to LLQ, although she states she has generalized abd tenderness   Musculoskeletal: Normal range of motion.   Lymphadenopathy:     She has no cervical adenopathy.   Neurological: She is alert and oriented to person, place, and time. She has normal strength. No cranial nerve deficit or sensory deficit. Gait normal.   Skin: Skin is warm and dry. No rash noted.   Psychiatric: She has a normal mood and affect. Her speech is normal and behavior is normal. Judgment and thought content normal. Cognition and memory are normal.   Nursing note and vitals reviewed.      Pertinent Labs      Key Imaging/Tracings/POC Testing    Assessment and Medications  Problems Addressed this Visit        Cardiovascular and Mediastinum    Essential hypertension    Relevant Orders    Ambulatory Referral to Cardiology (Completed)    Chronic  congestive heart failure    Relevant Medications    clopidogrel (PLAVIX) 75 MG tablet    Other Relevant Orders    Ambulatory Referral to Cardiology (Completed)      Other Visit Diagnoses     Generalized abdominal pain    -  Primary    Relevant Orders    Comprehensive Metabolic Panel    CBC & Differential    Urinalysis With / Culture If Indicated - Urine, Clean Catch    Amylase    H. Pylori Antibody, IgG    Bilious vomiting with nausea        Relevant Medications    promethazine (PHENERGAN) 25 MG tablet    Other Relevant Orders    Comprehensive Metabolic Panel    CBC & Differential    Urinalysis With / Culture If Indicated - Urine, Clean Catch    Amylase    H. Pylori Antibody, IgG        Side effects of ordered medications discussed with patient.     Plan/Additional Notes/Instructions  Plan   1. Patient refuses to complete labs or further work up today  2. Patient can not provide a urine sample in office today  3. Patient states she will complete all labs and further work-up tomorrow at [facility]  4. Phenergan for prn nausea  5. Advised patient to stop pepto- bismol  6. Patient is requesting a note for her sons employer stating she was evaluated and treated today for abd pain- she is requesting to back date the letter to 1/16/2018. I will provide a letter today stating that I did see and treat the patient today for reported nausea and vomiting for 1 week as the patient stated during the office visit. I have explained that I can not attest to treating the patient on 1/16/18 as no one called the office to inform of any illness until 1/23/2018. [facility] paperwork has been previously filled out for patient sonTony.   7. Will call patient after labwork has resulted and advance POC as needed after review.   8. If you experience respiratory distress, chest pain, bloody emesis, fever, bloody diarrhea, confusion, syncope, or feelings of impending doom, call 911 or have someone drive you to the nearest  ER.    Follow-Up  Return if symptoms worsen or fail to improve, for After ordered studies are completed.    Patient/caregiver verbalizes understanding of all orders and instructions in this plan of care.           This document has been electronically signed by DANIELE Carpenter on January 24, 2018 11:50 AM

## 2018-01-25 NOTE — TELEPHONE ENCOUNTER
DANIELE Rose talked to the patient and due to lab results she may be in acute kidney failure and she is dehydrated. She is headed to the ER.

## 2018-01-26 NOTE — TELEPHONE ENCOUNTER
I called the patients sister due to the patient not answering her phone.   She was on lasix due to heart failure and her cardiologist put her on Lasix, Dr Hoover. The patient was supposed to follow up with Dr Hoover and has not done so.   Now the patient is in kidney failure and she needs to stop taking the Lasix.   The patients sister understood and is going to relay to the patient. If there are any further questions they will call on Monday.

## 2018-01-29 NOTE — TELEPHONE ENCOUNTER
Called pt to relay result on UA. Patient was satisfied with results and I let her know Rah would call in Ampicillin today. She was instructed to make a f/u appt after it is finished to check Urine again

## 2018-01-29 NOTE — TELEPHONE ENCOUNTER
----- Message from DANIELE Carpenter sent at 1/29/2018  3:52 PM CST -----  Will call in ampicillin today,. F/u after completion

## 2018-01-31 NOTE — TELEPHONE ENCOUNTER
Called pt. Left vm due to pt not answering for her to take prednisone for 5 days. Also let her know she doesn't have a dvt but does have gout

## 2018-01-31 NOTE — PROGRESS NOTES
Chief Complaint   Patient presents with   • Foot Pain     started yesterday in right foot, but left beginning to hurt. pt stated could be gout       Subjective   Mariela Torres is a 62 y.o. female presents to the office for evaluation of right foot edema and redness X 1 day.    PMH  CKD- stage 3    Acute Kidney Injury- stop lasix 01/2018    HTN- managed with carvedilol- well controlled    CHF- managed with aldactone- well controlled controlled.    Lasix d/c secondary to RAJENDRA    COPD- managed with albuterol, incruse- well controlled    Peripheral neuropathy- managed with gabapentin- well managed    Hypokalemia- managed with Kclor    GERD- well controlled with protonix    Chronic a fib- plavix    HPI   Patient presents with a 1-2 day history of acute onset right foot pain, swelling, and redness. Pain began in right great toe joint, MTP. Associated symptoms include redness.   Pain has now progressed to include all toes, lateral foot edge, and heel. Foot appears swollen, skin is somewhat shiny, and reddish/purple in color that is somewhat alleviated by elevating foot. Patient is guarded. States everything hurts her foot, even her socks. She has never had anything like this before. She can not move her toes and can barely walk. She is requesting a rollator to help her ambulate.     She is on plavix for chronic a-fib. Denies ever having a blood clot or DVT.       Foot Pain   This is a new problem. The current episode started yesterday. The problem occurs constantly. The problem has been gradually worsening. Associated symptoms include joint swelling, a sore throat and weakness. Pertinent negatives include no abdominal pain, anorexia, arthralgias, change in bowel habit, chest pain, chills, congestion, coughing, diaphoresis, fatigue, fever, headaches, myalgias, nausea, neck pain, numbness, rash, swollen glands, urinary symptoms, vertigo, visual change or vomiting. Associated symptoms comments: Says it feels hot. The  symptoms are aggravated by walking. She has tried position changes for the symptoms. The treatment provided no relief.     Family History   Problem Relation Age of Onset   • Diabetes Mother    • Cancer Maternal Grandmother    • Cancer Maternal Grandfather      Social History     Social History   • Marital status:      Spouse name: N/A   • Number of children: N/A   • Years of education: N/A     Occupational History   • Not on file.     Social History Main Topics   • Smoking status: Current Every Day Smoker     Packs/day: 0.50     Years: 45.00     Types: Cigarettes   • Smokeless tobacco: Current User     Types: Snuff      Comment: currently using patches    • Alcohol use No   • Drug use: No   • Sexual activity: No     Other Topics Concern   • Not on file     Social History Narrative       The following portions of the patient's history were reviewed and updated as appropriate: allergies, current medications, past family history, past medical history, past social history, past surgical history and problem list.    Review of Systems   Constitutional: Negative for activity change, chills, diaphoresis, fatigue, fever and unexpected weight change.   HENT: Positive for sore throat. Negative for congestion, ear pain, postnasal drip, rhinorrhea and sinus pressure.    Eyes: Negative.  Negative for pain and redness.   Respiratory: Negative.  Negative for cough, choking, chest tightness, shortness of breath and wheezing.    Cardiovascular: Negative.  Negative for chest pain, palpitations and leg swelling.   Gastrointestinal: Negative.  Negative for abdominal distention, abdominal pain, anorexia, change in bowel habit, constipation, diarrhea, nausea, rectal pain and vomiting.   Endocrine: Negative.    Genitourinary: Negative.  Negative for decreased urine volume, difficulty urinating, dysuria, flank pain, hematuria and urgency.   Musculoskeletal: Positive for joint swelling. Negative for arthralgias, gait problem, myalgias  "and neck pain.   Skin: Positive for color change. Negative for rash and wound.   Allergic/Immunologic: Negative.    Neurological: Positive for weakness. Negative for dizziness, vertigo, syncope, light-headedness, numbness and headaches.   Hematological: Negative.    Psychiatric/Behavioral: Negative.  Negative for agitation, behavioral problems, confusion, self-injury, sleep disturbance and suicidal ideas. The patient is not nervous/anxious.      14 Point ROS completed with pertinent positives discussed. All other systems reviewed and are negative.       Objective   Encounter Vitals  Pulse 98  Temp 96.9 °F (36.1 °C) (Tympanic)   Resp 20  Ht 157.5 cm (62\")  Wt 59 kg (130 lb)  SpO2 99%  BMI 23.78 kg/m2  Vitals:    01/31/18 1003   Pulse: 98   Resp: 20   Temp: 96.9 °F (36.1 °C)   TempSrc: Tympanic   SpO2: 99%   Weight: 59 kg (130 lb)   Height: 157.5 cm (62\")       Physical Exam   Constitutional: She is oriented to person, place, and time. Vital signs are normal. She appears well-developed and well-nourished.  Non-toxic appearance. She does not appear ill. She appears distressed.   HENT:   Head: Normocephalic and atraumatic.   Right Ear: Tympanic membrane, external ear and ear canal normal.   Left Ear: Tympanic membrane, external ear and ear canal normal.   Nose: Nose normal.   Mouth/Throat: Uvula is midline, oropharynx is clear and moist and mucous membranes are normal. Abnormal dentition. No posterior oropharyngeal edema or posterior oropharyngeal erythema.   Eyes: Lids are normal. Pupils are equal, round, and reactive to light.   Neck: Trachea normal and normal range of motion. Neck supple. No thyroid mass present.   Cardiovascular: Normal rate, regular rhythm, S1 normal, S2 normal, normal heart sounds and intact distal pulses.   No extrasystoles are present. Exam reveals no gallop and no friction rub.    No murmur heard.  Pulmonary/Chest: Effort normal and breath sounds normal. No respiratory distress. She has " no wheezes. She has no rhonchi. She has no rales.   Abdominal: Soft. Normal appearance and bowel sounds are normal. She exhibits no mass. There is no tenderness. There is no rigidity, no rebound, no guarding, no tenderness at McBurney's point and negative Pacheco's sign.   Musculoskeletal: Normal range of motion.       Vascular Status -  Her exam exhibits right foot edema. Her exam exhibits right foot vasculature abnormal (decrease pulse to right DP: 1+). Her exam exhibits left foot vasculature normal (2+ DP pulse). Her exam exhibits no left foot edema.   Skin Integrity  -  She has right foot warmth..  Lymphadenopathy:     She has no cervical adenopathy.   Neurological: She is alert and oriented to person, place, and time. She has normal strength. No cranial nerve deficit or sensory deficit. Gait normal.   Skin: Skin is warm and dry. No rash noted.   Psychiatric: She has a normal mood and affect. Her speech is normal and behavior is normal. Judgment and thought content normal. Cognition and memory are normal.   Nursing note and vitals reviewed.      Pertinent Labs  Office Visit on 01/24/2018   Component Date Value Ref Range Status   • Glucose 01/25/2018 112* 70 - 100 mg/dL Final   • BUN 01/25/2018 40* 8 - 25 mg/dL Final   • Creatinine 01/25/2018 2.10* 0.40 - 1.30 mg/dL Final   • Sodium 01/25/2018 134  134 - 146 mmol/L Final   • Potassium 01/25/2018 3.7  3.4 - 5.4 mmol/L Final   • Chloride 01/25/2018 89* 100 - 112 mmol/L Final   • CO2 01/25/2018 33.0* 20.0 - 32.0 mmol/L Final   • Calcium 01/25/2018 9.4  8.4 - 10.8 mg/dL Final   • Total Protein 01/25/2018 7.6  6.7 - 8.2 g/dL Final   • Albumin 01/25/2018 4.10  3.20 - 5.50 g/dL Final   • ALT (SGPT) 01/25/2018 14  10 - 60 U/L Final   • AST (SGOT) 01/25/2018 25  10 - 60 U/L Final   • Alkaline Phosphatase 01/25/2018 63  15 - 121 U/L Final   • Total Bilirubin 01/25/2018 1.9* 0.2 - 1.0 mg/dL Final   • eGFR Non African Amer 01/25/2018 24* 45 - 104 mL/min/1.73 Final   •  Globulin 01/25/2018 3.5  2.5 - 4.6 gm/dL Final   • A/G Ratio 01/25/2018 1.2  1.0 - 3.0 g/dL Final   • BUN/Creatinine Ratio 01/25/2018 19.0  7.0 - 25.0 Final   • Anion Gap 01/25/2018 12.0  5.0 - 15.0 mmol/L Final   • Color, UA 01/25/2018 Yellow  Yellow, Straw Final   • Appearance, UA 01/25/2018 Cloudy* Clear Final   • pH, UA 01/25/2018 5.5  5.5 - 8.0 Final   • Specific Gravity, UA 01/25/2018 1.010  1.005 - 1.030 Final   • Glucose, UA 01/25/2018 Negative  Negative Final   • Ketones, UA 01/25/2018 Negative  Negative Final   • Bilirubin, UA 01/25/2018 Negative  Negative Final   • Blood, UA 01/25/2018 Negative  Negative Final   • Protein, UA 01/25/2018 Negative  Negative Final   • Leuk Esterase, UA 01/25/2018 Trace* Negative Final   • Nitrite, UA 01/25/2018 Negative  Negative Final   • Urobilinogen, UA 01/25/2018 0.2 E.U./dL  0.2 - 1.0 E.U./dL Final   • Amylase 01/25/2018 44  25 - 140 U/L Final   • H. pylori IgG 01/25/2018 Negative  Negative Final   • WBC 01/25/2018 7.71  3.20 - 9.80 10*3/mm3 Final   • RBC 01/25/2018 5.43* 3.77 - 5.16 10*6/mm3 Final   • Hemoglobin 01/25/2018 14.5  12.0 - 15.5 g/dL Final   • Hematocrit 01/25/2018 42.2  35.0 - 45.0 % Final   • MCV 01/25/2018 77.7* 80.0 - 98.0 fL Final   • MCH 01/25/2018 26.7  26.5 - 34.0 pg Final   • MCHC 01/25/2018 34.4  31.4 - 36.0 g/dL Final   • RDW 01/25/2018 16.1* 11.5 - 14.5 % Final   • RDW-SD 01/25/2018 45.3  36.4 - 46.3 fl Final   • MPV 01/25/2018 9.0  8.0 - 12.0 fL Final   • Platelets 01/25/2018 136* 150 - 450 10*3/mm3 Final   • Neutrophil % 01/25/2018 57.3  37.0 - 80.0 % Final   • Lymphocyte % 01/25/2018 22.2  10.0 - 50.0 % Final   • Monocyte % 01/25/2018 8.8  0.0 - 12.0 % Final   • Eosinophil % 01/25/2018 11.3* 0.0 - 7.0 % Final   • Basophil % 01/25/2018 0.4  0.0 - 2.0 % Final   • Neutrophils, Absolute 01/25/2018 4.42  2.00 - 8.60 10*3/mm3 Final   • Lymphocytes, Absolute 01/25/2018 1.71  0.60 - 4.20 10*3/mm3 Final   • Monocytes, Absolute 01/25/2018 0.68  0.00 -  0.90 10*3/mm3 Final   • Eosinophils, Absolute 01/25/2018 0.87* 0.00 - 0.70 10*3/mm3 Final   • Basophils, Absolute 01/25/2018 0.03  0.00 - 0.20 10*3/mm3 Final   • RBC, UA 01/25/2018 0-2* None Seen /HPF Final   • WBC, UA 01/25/2018 3-5* None Seen /HPF Final   • Bacteria, UA 01/25/2018 3+* None Seen /HPF Final   • Squamous Epithelial Cells, UA 01/25/2018 0-2  None Seen, 0-2 /HPF Final   • Hyaline Casts, UA 01/25/2018 None Seen  None Seen /LPF Final   • Methodology 01/25/2018 Manual Light Microscopy   Final   • Urine Culture 01/25/2018 >100,000 CFU/mL Enterococcus faecalis*  Final     Labs have been independently reviewed    Key Imaging/Tracings/POC Testing    Assessment and Medications  Problems Addressed this Visit     None      Visit Diagnoses     Pain and swelling of lower leg, right    -  Primary    Relevant Orders    US Venous Doppler Lower Extremity Right (duplex)    Comprehensive Metabolic Panel    Uric acid    Pain in right foot        Altered gait        Inability to bear weight        Edema of right foot        Relevant Orders    US Venous Doppler Lower Extremity Right (duplex)    Uric acid    Erythema of foot        Relevant Orders    US Venous Doppler Lower Extremity Right (duplex)    Uric acid        Side effects of ordered medications discussed with patient.     Plan/Additional Notes/Instructions  Plan   1. Suspected gout versus DVT  2. Venous doppler to rule out dvt- patient is often non-compliant, may not be taking plavix as directed  3. Uric acid level to rule out/in gout- given this would be a new diagnosis  4. Will call with results of above ordered studies and advance/change POC as needed after review  5. Patient is requesting rollator- Rx given. Explained that I did not think insurance would cover but she could try.     Follow-Up  Return if symptoms worsen or fail to improve, for After ordered studies are completed.    Patient/caregiver verbalizes understanding of all orders and instructions in  this plan of care.           This document has been electronically signed by DANIELE Carpenter on January 31, 2018 12:32 PM

## 2018-01-31 NOTE — TELEPHONE ENCOUNTER
----- Message from DANIELE Carpenter sent at 1/31/2018  2:50 PM CST -----  No DVT.  She does have gout. I will call her in some prednisone to take twice a day for 5 days.

## 2018-02-06 PROBLEM — J44.9 CHRONIC OBSTRUCTIVE PULMONARY DISEASE (HCC): Status: ACTIVE | Noted: 2018-01-01

## 2018-02-06 NOTE — PROGRESS NOTES
"Chief Complaint   Patient presents with   • Cough     symptoms started yesterday, exposure to strep   • Facial Pain       Subjective   Mariela Torres is a 62 y.o. female presents to the office for evaluation of cough, facial pain, and body aches.    PMH  CKD- stage 3    Acute Kidney Injury- stop lasix 01/2018    HTN- managed with carvedilol- well controlled    CHF- managed with aldactone- well controlled controlled.    Lasix d/c secondary to RAJENDRA    COPD- managed with albuterol, incruse- well controlled    Peripheral neuropathy- managed with gabapentin- well managed    Hypokalemia- managed with Kclor    GERD- well controlled with protonix    Chronic a fib- plavix    HPI   Patient presents with a 2 day history of dry cough, facial pain, and body aches. Associated symptoms include chills. She has been exposed to strep by her grandson. He tested positive 2 days ago. She states she started feeling bad after learning of his diagnosis.   She denies rhinorrhea and refuses flu testing today.   She denies fever but does admit to having chills at night. Arms and legs do not hurt, but she states she \"feels bad all over\".   She was recently treated for UTI- denies urinary symptoms.     She states \"I want you to give me something to make me feel better right now\".   Cough   This is a new problem. The current episode started yesterday. The problem has been gradually worsening. The cough is non-productive. Associated symptoms include chills, nasal congestion and postnasal drip. Pertinent negatives include no chest pain, ear congestion, ear pain, eye redness, fever, headaches, heartburn, hemoptysis, myalgias, rash, rhinorrhea, sore throat, shortness of breath, sweats, weight loss or wheezing. Nothing aggravates the symptoms. She has tried nothing for the symptoms. The treatment provided no relief. Her past medical history is significant for bronchitis, COPD and pneumonia. There is no history of asthma, bronchiectasis, emphysema or " environmental allergies.   Facial Pain   This is a new problem. The current episode started yesterday. The problem occurs constantly. The problem has been unchanged. Associated symptoms include chills and coughing. Pertinent negatives include no abdominal pain, chest pain, congestion, diaphoresis, fatigue, fever, headaches, myalgias, nausea, neck pain, numbness, rash, sore throat, swollen glands, vomiting or weakness. Nothing aggravates the symptoms. She has tried nothing for the symptoms. The treatment provided no relief.     Family History   Problem Relation Age of Onset   • Diabetes Mother    • Cancer Maternal Grandmother    • Cancer Maternal Grandfather      Social History     Social History   • Marital status:      Spouse name: N/A   • Number of children: N/A   • Years of education: N/A     Occupational History   • Not on file.     Social History Main Topics   • Smoking status: Current Every Day Smoker     Packs/day: 0.50     Years: 45.00     Types: Cigarettes   • Smokeless tobacco: Current User     Types: Snuff      Comment: currently using patches    • Alcohol use No   • Drug use: No   • Sexual activity: No     Other Topics Concern   • Not on file     Social History Narrative       The following portions of the patient's history were reviewed and updated as appropriate: allergies, current medications, past family history, past medical history, past social history, past surgical history and problem list.    Review of Systems   Constitutional: Positive for chills. Negative for activity change, diaphoresis, fatigue, fever, unexpected weight change and weight loss.   HENT: Positive for postnasal drip. Negative for congestion, ear pain, rhinorrhea, sinus pain, sinus pressure and sore throat.    Eyes: Negative.  Negative for pain and redness.   Respiratory: Positive for cough. Negative for hemoptysis, choking, chest tightness, shortness of breath and wheezing.    Cardiovascular: Negative.  Negative for chest  "pain, palpitations and leg swelling.   Gastrointestinal: Negative.  Negative for abdominal distention, abdominal pain, constipation, diarrhea, heartburn, nausea, rectal pain and vomiting.   Endocrine: Negative.    Genitourinary: Negative.  Negative for decreased urine volume, difficulty urinating, dysuria, flank pain, hematuria and urgency.   Musculoskeletal: Negative.  Negative for gait problem, myalgias and neck pain.   Skin: Negative.  Negative for rash and wound.   Allergic/Immunologic: Negative.  Negative for environmental allergies.   Neurological: Negative.  Negative for dizziness, syncope, weakness, light-headedness, numbness and headaches.   Hematological: Negative.    Psychiatric/Behavioral: Negative.  Negative for agitation, behavioral problems, confusion, self-injury, sleep disturbance and suicidal ideas. The patient is not nervous/anxious.      14 Point ROS completed with pertinent positives discussed. All other systems reviewed and are negative.       Objective   Encounter Vitals  /86  Pulse 101  Temp 96.4 °F (35.8 °C) (Tympanic)   Resp 20  Ht 157.5 cm (62\")  Wt 59.1 kg (130 lb 6 oz)  SpO2 95%  BMI 23.85 kg/m2  Vitals:    02/06/18 0836   BP: 142/86   Pulse: 101   Resp: 20   Temp: 96.4 °F (35.8 °C)   TempSrc: Tympanic   SpO2: 95%   Weight: 59.1 kg (130 lb 6 oz)   Height: 157.5 cm (62\")       Physical Exam   Constitutional: She is oriented to person, place, and time. Vital signs are normal. She appears well-developed and well-nourished.  Non-toxic appearance. She does not appear ill. She appears distressed.   HENT:   Head: Normocephalic and atraumatic.   Right Ear: Tympanic membrane, external ear and ear canal normal.   Left Ear: Tympanic membrane, external ear and ear canal normal.   Nose: Nose normal.   Mouth/Throat: Uvula is midline, oropharynx is clear and moist and mucous membranes are normal. Abnormal dentition. No posterior oropharyngeal edema or posterior oropharyngeal erythema. "   Clear post nasal drip   Eyes: Lids are normal. Pupils are equal, round, and reactive to light.   Neck: Trachea normal and normal range of motion. Neck supple. No thyroid mass present.   Cardiovascular: Normal rate, regular rhythm, S1 normal, S2 normal, normal heart sounds and intact distal pulses.   No extrasystoles are present. Exam reveals no gallop and no friction rub.    No murmur heard.  Pulmonary/Chest: Effort normal. No respiratory distress. She has no wheezes. She has rhonchi in the right lower field and the left lower field. She has no rales.   Abdominal: Soft. Normal appearance and bowel sounds are normal. She exhibits no mass. There is no tenderness. There is no rigidity, no rebound, no guarding, no tenderness at McBurney's point and negative Pacheco's sign.   Musculoskeletal: Normal range of motion.   Lymphadenopathy:     She has no cervical adenopathy.   Neurological: She is alert and oriented to person, place, and time. She has normal strength. No cranial nerve deficit or sensory deficit. Gait normal.   Skin: Skin is warm and dry. No rash noted.   Psychiatric: She has a normal mood and affect. Her speech is normal and behavior is normal. Judgment and thought content normal. Cognition and memory are normal.   Nursing note and vitals reviewed.      Pertinent Labs  Brief Urine Lab Results  (Last result in the past 365 days)      Color   Clarity   Blood   Leuk Est   Nitrite   Protein   CREAT   Urine HCG        02/06/18 1136 Yellow Clear Negative Small (1+)(A) Negative Trace(A)             Labs have been independently reviewed    Key Imaging/Tracings/POC Testing    Assessment and Medications  Problems Addressed this Visit        Respiratory    Chronic obstructive pulmonary disease    Relevant Medications    albuterol (PROVENTIL) (2.5 MG/3ML) 0.083% nebulizer solution      Other Visit Diagnoses     Upper respiratory tract infection, unspecified type    -  Primary    Relevant Medications    azithromycin  (ZITHROMAX) 250 MG tablet    Strep throat exposure        Relevant Orders    POCT rapid strep A (Completed)    Dysuria        Relevant Orders    POCT urinalysis dipstick, automated (Completed)        Side effects of ordered medications discussed with patient.     Plan/Additional Notes/Instructions  Plan   1. U/a has improved  2. No obvious source of infection- may be start of sinus infection or COPD exacerbation  3. If no better in 1-2 days, may start zpack   4. RTC if no better after completion of above ordered POC, or if symptoms worsen.  5. If you experience respiratory distress, chest pain, confusion, syncope, or feelings of impending doom, call 911 or have someone drive you to the nearest ER.      Follow-Up  Return if symptoms worsen or fail to improve.    Patient/caregiver verbalizes understanding of all orders and instructions in this plan of care.           This document has been electronically signed by DANIELE Carpenter on February 6, 2018 1:30 PM

## 2018-02-06 NOTE — TELEPHONE ENCOUNTER
Pt called and I relayed that her lab came back neg for any infection. Also let pt know we sent in a antibiotic, and if she got any worse please come back for a flu swab which she declined earlier. She said she would

## 2018-02-07 NOTE — TELEPHONE ENCOUNTER
Called pt to check on after sick visit. Pt started taking meds yesterday and said she felt so much better today.